# Patient Record
Sex: FEMALE | Race: ASIAN | Employment: OTHER | URBAN - METROPOLITAN AREA
[De-identification: names, ages, dates, MRNs, and addresses within clinical notes are randomized per-mention and may not be internally consistent; named-entity substitution may affect disease eponyms.]

---

## 2023-10-05 ENCOUNTER — OFFICE VISIT (OUTPATIENT)
Dept: CARDIOLOGY | Facility: CLINIC | Age: 78
End: 2023-10-05
Attending: INTERNAL MEDICINE
Payer: COMMERCIAL

## 2023-10-05 VITALS
SYSTOLIC BLOOD PRESSURE: 171 MMHG | WEIGHT: 159.8 LBS | OXYGEN SATURATION: 99 % | DIASTOLIC BLOOD PRESSURE: 79 MMHG | HEART RATE: 65 BPM

## 2023-10-05 DIAGNOSIS — R06.02 SOB (SHORTNESS OF BREATH): ICD-10-CM

## 2023-10-05 DIAGNOSIS — I25.10 CORONARY ARTERY DISEASE INVOLVING NATIVE HEART, UNSPECIFIED VESSEL OR LESION TYPE, UNSPECIFIED WHETHER ANGINA PRESENT: ICD-10-CM

## 2023-10-05 DIAGNOSIS — I35.0 SEVERE AORTIC STENOSIS: Primary | ICD-10-CM

## 2023-10-05 PROCEDURE — 93010 ELECTROCARDIOGRAM REPORT: CPT | Performed by: INTERNAL MEDICINE

## 2023-10-05 PROCEDURE — 99215 OFFICE O/P EST HI 40 MIN: CPT | Performed by: INTERNAL MEDICINE

## 2023-10-05 PROCEDURE — 93005 ELECTROCARDIOGRAM TRACING: CPT | Performed by: INTERNAL MEDICINE

## 2023-10-05 PROCEDURE — G0463 HOSPITAL OUTPT CLINIC VISIT: HCPCS | Performed by: INTERNAL MEDICINE

## 2023-10-05 RX ORDER — ATORVASTATIN CALCIUM 20 MG/1
20 TABLET, FILM COATED ORAL DAILY
COMMUNITY
Start: 2023-09-15

## 2023-10-05 RX ORDER — LISINOPRIL 40 MG/1
40 TABLET ORAL DAILY
COMMUNITY
Start: 2023-09-15

## 2023-10-05 RX ORDER — ASPIRIN 81 MG/1
243 TABLET, CHEWABLE ORAL ONCE
Status: CANCELLED | OUTPATIENT
Start: 2023-10-05

## 2023-10-05 RX ORDER — HYDROCHLOROTHIAZIDE 25 MG/1
25 TABLET ORAL DAILY
Status: ON HOLD | COMMUNITY
Start: 2023-09-15 | End: 2023-10-26

## 2023-10-05 RX ORDER — POTASSIUM CHLORIDE 1500 MG/1
40 TABLET, EXTENDED RELEASE ORAL
Status: CANCELLED | OUTPATIENT
Start: 2023-10-05

## 2023-10-05 RX ORDER — ASPIRIN 325 MG
325 TABLET ORAL ONCE
Status: CANCELLED | OUTPATIENT
Start: 2023-10-05 | End: 2023-10-05

## 2023-10-05 RX ORDER — BLOOD SUGAR DIAGNOSTIC
STRIP MISCELLANEOUS
COMMUNITY
Start: 2023-09-27

## 2023-10-05 RX ORDER — METOPROLOL SUCCINATE 100 MG/1
100 TABLET, EXTENDED RELEASE ORAL DAILY
Status: ON HOLD | COMMUNITY
Start: 2023-09-15 | End: 2023-10-26

## 2023-10-05 RX ORDER — POTASSIUM CHLORIDE 1500 MG/1
20 TABLET, EXTENDED RELEASE ORAL
Status: CANCELLED | OUTPATIENT
Start: 2023-10-05

## 2023-10-05 RX ORDER — ACETAMINOPHEN 500 MG
500-1000 TABLET ORAL EVERY 6 HOURS PRN
COMMUNITY
Start: 2022-07-12

## 2023-10-05 RX ORDER — FLUTICASONE PROPIONATE 44 UG/1
2 AEROSOL, METERED RESPIRATORY (INHALATION) 2 TIMES DAILY
COMMUNITY
Start: 2023-09-15 | End: 2024-09-14

## 2023-10-05 RX ORDER — LIDOCAINE 40 MG/G
CREAM TOPICAL
Status: CANCELLED | OUTPATIENT
Start: 2023-10-05

## 2023-10-05 RX ORDER — SODIUM CHLORIDE 9 MG/ML
INJECTION, SOLUTION INTRAVENOUS CONTINUOUS
Status: CANCELLED | OUTPATIENT
Start: 2023-10-05

## 2023-10-05 RX ORDER — ALBUTEROL SULFATE 90 UG/1
AEROSOL, METERED RESPIRATORY (INHALATION)
COMMUNITY
Start: 2023-09-15

## 2023-10-05 ASSESSMENT — PAIN SCALES - GENERAL: PAINLEVEL: NO PAIN (0)

## 2023-10-05 NOTE — LETTER
10/5/2023      RE: Lacey Casarez  [address]  [facility] [address]       Dear Colleague,    Thank you for the opportunity to participate in the care of your patient, Lacey Casarez, at the [facility] HEART [facility]IN[facility] [place] [facility]. Please see a copy of my visit note below.        [facility] HEART CARE  CARDIOVASCULAR DIVISION    VALVE CLINIC INITIAL CONSULTATION    PRIMARY CARDIOLOGIST: Dr. Connors      PERTINENT CLINICAL HISTORY:     Lacey Casarez is a very pleasant 78 year old female with severe aortic valvular stenosis referred to our clinic for evaluation and consideration for potential transcatheter aortic valve replacement (TAVR).  Her severe aortic stenosis is characterized with an area of 0.58 cm2, mean gradient 61 mmHg and peak velocity of 5.5 m/sec with LVEF 60% by echocardiogram on 9/25/23.  Additional notable medical history of HTN, HLD, pre diabetes.     Patient was recently seen by her primary care physician, who noted a systolic murmur on physical exam. Patient also complained of increased exertional dyspnea prompting on echocardiogram that demonstrated progression of aortic stenosis from mild in 2017 to severe. Patient says that she has been getting progressively short of breath going up the stairs at her [employer]. Denies any chest pain, syncope/presyncope, orthopnea, PND or lower extremity edema. She is very anxious about undergoing invasive procedures.      PAST MEDICAL HISTORY:   HTN  Prediabetes   Hyperlipidemia  Severe aortic stenosis   PAST SURGICAL HISTORY:   No past surgical history on file.     CURRENT MEDICATIONS:     Current Outpatient Medications   Medication Sig Dispense Refill     acetaminophen (TYLENOL) 500 MG tablet Maximum acetaminophen dose is 4000 mg in 24 hours       albuterol (PROAIR HFA/PROVENTIL HFA/VENTOLIN HFA) 108 (90 Base) MCG/ACT inhaler Inhale 1-2 Puffs every 6 hours as needed for Wheezing.        atorvastatin (LIPITOR) 20 MG tablet Take 20 mg by mouth daily       blood glucose (ONETOUCH VERIO IQ) test strip TEST BLOOD SUGAR ONCE WEEKLY.       fluticasone (FLOVENT HFA) 44 MCG/ACT inhaler Inhale 2 puffs into the lungs       hydrochlorothiazide (HYDRODIURIL) 25 MG tablet Take 25 mg by mouth daily       lisinopril (ZESTRIL) 40 MG tablet Take 40 mg by mouth daily       metoprolol succinate ER (TOPROL XL) 100 MG 24 hr tablet Take 100 mg by mouth daily        ALLERGIES:   Not on File     FAMILY HISTORY:   No family history on file.     SOCIAL HISTORY:   Not on file     REVIEW OF SYSTEMS:     Constitutional: No fevers or chills  Skin: No new rash or itching  Eyes: No acute change in vision  Ears/Nose/Throat: No purulent rhinorrhea, new hearing loss, or new vertigo  Respiratory: No cough or hemoptysis  Cardiovascular: See HPI  Gastrointestinal: No change in appetite, vomiting, hematemesis or diarrhea  Genitourinary: No dysuria or hematuria  Musculoskeletal: No new back pain, neck pain or muscle pain  Neurologic: No new headaches, focal weakness or behavior changes  Psychiatric: No hallucinations, excessive alcohol consumption or illegal drug usage  Hematologic/Lymphatic/Immunologic: No bleeding, chills, fever, night sweats or weight loss  Endocrine: No new cold intolerance, heat intolerance, polyphagia, polydipsia or polyuria      PHYSICAL EXAMINATION:     BP (!) 171/79 (BP Location: Right arm, Patient Position: Chair, Cuff Size: Adult Regular)   Pulse 65   Wt 72.5 kg (159 lb 12.8 oz)   SpO2 99%     GENERAL: No acute distress.  HEENT: EOMI. Sclerae white, not injected. Nares clear. Pharynx without erythema or exudate.   Neck: No adenopathy. No thyromegaly. Symmetrical.   Heart: Regular rate and rhythm. Harsh crescendo decrescendo late peaking systolic murmur with radiation to carotids. Delayed carotid pulses.   Lungs: Clear to auscultation. No ronchi, wheezes, rales.   Abdomen: Soft, nontender, nondistended. Bowel  sounds present.  Extremities: No clubbing, cyanosis, or edema.   Neurologic: Alert and oriented to person/place/time, normal speech and affect. No focal deficits.  Skin: No petechiae, purpura or rash.     LABORATORY DATA:     Reviewed from [facility] within normal limits     PROCEDURES & FURTHER ASSESSMENTS:     ECG dated 10/5/2023:    Echocardiogram dated :  Echo 09/25/23:  Severe aortic stenosis. Peak velocity 5.5 m/s, mean gradient 61 mmHg,  PAUL 0.58 cm2.  Mild aortic regurgitation.  Normal left ventricular size and global and regional function. Left  ventricular ejection fraction is visually estimated at 65%.  Normal right ventricle size and normal global function.  Severe left atrial enlargement.  Mild tricuspid regurgitation.  Pulmonary artery systolic pressure estimate is 60mmHg above  RAP(Elevated).  Compared to prior, aortic stenosis has progressed significantly.    STS RISK SCORE: 3.18%  FRAILTY SCORE: 0/5  DENTAL SCREEN: Pass  NYHA CLASS: II    Mallampati score: II    ASA physical status classification: 3     CLINICAL IMPRESSION:   Lacey Casarez is a very pleasant 78 year old female with severe aortic valvular stenosis referred to our clinic for evaluation and consideration for potential transcatheter aortic valve replacement (TAVR).  Patient reports progressive exertional dyspnea with climbing stairs with recent echo demonstrating severe aortic stenosis characterized with an area of 0.58 cm2, mean gradient 61 mmHg and peak velocity of 5.5 m/sec with LVEF 60%.      We discussed the natural history of severe aortic stenosis, associated symptoms and available treatment options including transcatheter versus surgical aortic valve replacement. Patient is a appropriate candidate for transcatheter aortic valve replacement based on age, frailty, co-morbidities and surgical mortality risk estimation of 3.18% based on the STS score.     The pre-procedural TAVR evaluation currently requires additional  assessment with CT TAVR, coronary angiogram, and right heart catheterization prior to presentation to the Heart team for discussion during our multi-disciplinary conference for consensus decision and procedural planning.     Plan Summary:  1) Severe Aortic Stenosis:  -Proceed with TAVR evalution   -CT TAVR, coronary angiogram and right heart catheterization  -Following completion of TAVR work-up patient case will be discussed during our multi-disciplinary conference for consensus decision and procedural planning    Patient was evaluated in clinic with Dr. Heredia.    HONEY Preston, CNP  Structural Heart Nurse Practitioner  [facility]  Clinic: [phone]  Pager: [phone]      CC  No care team member to display  SELF, REFERRED          Please do not hesitate to contact me if you have any questions/concerns.     Sincerely,     Jarocho Heredia MD

## 2023-10-05 NOTE — NURSING NOTE
Chief Complaint   Patient presents with    New Patient     NEW TAVR patient     Vitals were taken and medications reconciled.    Wally Starr, EMT  2:21 PM

## 2023-10-05 NOTE — PATIENT INSTRUCTIONS
You were seen today in the Cardiovascular Clinic at the [facility].      Cardiology Providers you saw during your visit:  Dr. Jarocho Heredia and Dee Lopez NP    Recommendations:    TAVR CT  Coronary angiogram  ++++++++++++++++++++++++++++++++++++++++++++++++++++++++++++++++++++++++++++++++  October 6. Check in at 9:45 a.m.  Pre-procedure instructions - CT Angiogram TAVR/TMVR  Patient Education    Your arrival time is 9:45 .am.  Location is [address] Waiting [facility]  How do I prepare for my exam? (Food and drink instructions)  **You will have contrast for this exam.**  No Food and Drink Restrictions     The day before your exam, drink extra fluids-at least six 8-ounce glasses (unless your doctor tells you to restrict your fluids).     What should I wear: Please wear loose clothing, such as a sweat suit or jogging clothes.  Avoid snaps, zippers and other metal. We may ask you to undress and put on a hospital gown.     How long does the exam take: Plan to spend up to 1 hour at the hospital     What should I bring: Please bring any scans or X-rays taken at other hospitals, if similar tests were done. It is safest to leave personal items at home.     Do I need a :  No  is needed.     What do I need to tell my doctor?  Be sure to tell your doctor:  * If you have any allergies.  * If there's any chance you are pregnant.  * If you are breastfeeding.  * If you have diabetes as your medication may need to be adjusted for this exam.     What should I do after the exam: No restrictions, You may resume normal activities.     What is this test: A CT (computed tomography) scan is a series of pictures that allows us to look inside your body. The scanner creates images of the body in cross sections, much like slices of bread. This helps us see any problems more clearly. You may receive contrast (X-ray dye) before  or during your scan. Contrast is given through an IV (small needle in your arm).     Who should I call with questions: If you have any questions, please call the Imaging Department where you will have your exam. Directions, parking instructions, and other information is available on our website, [email or web address].  +++++++++++++++++++++++++++++++++++++++++++++++++++++++++++++++++++++++    DATE:  TBD  Pre-procedure instructions - Coronary Angiogram  Patient Education    Your arrival time is TBD.  Location is [address] Waiting Room  Please plan on being at the hospital all day.  At any time, emergencies and/or urgent cases may come up which could delay the start of your procedure.    Pre-procedure instructions - Coronary Angiogram  Shower in the evening before or the morning of the procedure  No solid food for 8 hours prior and nothing to drink 2 hours prior to arrival time  You can take your morning medications (except for diabetic and blood thinners) with sips of water.  Take 325 mg of Asprin 24 hours prior to the procedure and the morning of procedure.   You will need to arrange a ride to drop you off and pick you up, as you will be unable to drive home.  Prior to discharge you may be required to lay flat for approximately 2-4 hours in the recovery unit to ensure proper clotting of the artery.    You will need to follow up with one of our cardiology APPs 1-2 weeks after your procedure. If you need help scheduling or rescheduling your appointment, please call [phone]      Nursing questions:  YOEL Duarte;  [phone]  For emergencies call 911.    It was a pleasure to see you in clinic.  If you have any questions at all, please feel free to give me a call.      Felicia Zayas RN  Structural Heart Care Coordinator   TAVR and MitraClip Programs  [facility]  Office: [phone]    Clinics and  Surgery Center  [address] [facility]  Cardiology Clinic [address]  [place] [address]

## 2023-10-05 NOTE — PROGRESS NOTES
[facility] HEART [facility]  CARDIOVASCULAR DIVISION    VALVE CLINIC INITIAL CONSULTATION    PRIMARY CARDIOLOGIST: Dr. Connors      PERTINENT CLINICAL HISTORY:     Lacey Casarez is a very pleasant 78 year old female with severe aortic valvular stenosis referred to our clinic for evaluation and consideration for potential transcatheter aortic valve replacement (TAVR).  Her severe aortic stenosis is characterized with an area of 0.58 cm2, mean gradient 61 mmHg and peak velocity of 5.5 m/sec with LVEF 60% by echocardiogram on 9/25/23.  Additional notable medical history of HTN, HLD, pre diabetes.     Patient was recently seen by her primary care physician, who noted a systolic murmur on physical exam. Patient also complained of increased exertional dyspnea prompting on echocardiogram that demonstrated progression of aortic stenosis from mild in 2017 to severe. Patient says that she has been getting progressively short of breath going up the stairs at her [employer]. Denies any chest pain, syncope/presyncope, orthopnea, PND or lower extremity edema. She is very anxious about undergoing invasive procedures.      PAST MEDICAL HISTORY:   HTN  Prediabetes   Hyperlipidemia  Severe aortic stenosis   PAST SURGICAL HISTORY:   No past surgical history on file.     CURRENT MEDICATIONS:     Current Outpatient Medications   Medication Sig Dispense Refill    acetaminophen (TYLENOL) 500 MG tablet Maximum acetaminophen dose is 4000 mg in 24 hours      albuterol (PROAIR HFA/PROVENTIL HFA/VENTOLIN HFA) 108 (90 Base) MCG/ACT inhaler Inhale 1-2 Puffs every 6 hours as needed for Wheezing.      atorvastatin (LIPITOR) 20 MG tablet Take 20 mg by mouth daily      blood glucose (ONETOUCH VERIO IQ) test strip TEST BLOOD SUGAR ONCE WEEKLY.      fluticasone (FLOVENT HFA) 44 MCG/ACT inhaler Inhale 2 puffs into the lungs      hydrochlorothiazide (HYDRODIURIL) 25 MG tablet Take 25 mg by mouth daily      lisinopril (ZESTRIL) 40 MG tablet Take 40 mg by  mouth daily      metoprolol succinate ER (TOPROL XL) 100 MG 24 hr tablet Take 100 mg by mouth daily        ALLERGIES:   Not on File     FAMILY HISTORY:   No family history on file.     SOCIAL HISTORY:   Not on file     REVIEW OF SYSTEMS:     Constitutional: No fevers or chills  Skin: No new rash or itching  Eyes: No acute change in vision  Ears/Nose/Throat: No purulent rhinorrhea, new hearing loss, or new vertigo  Respiratory: No cough or hemoptysis  Cardiovascular: See HPI  Gastrointestinal: No change in appetite, vomiting, hematemesis or diarrhea  Genitourinary: No dysuria or hematuria  Musculoskeletal: No new back pain, neck pain or muscle pain  Neurologic: No new headaches, focal weakness or behavior changes  Psychiatric: No hallucinations, excessive alcohol consumption or illegal drug usage  Hematologic/Lymphatic/Immunologic: No bleeding, chills, fever, night sweats or weight loss  Endocrine: No new cold intolerance, heat intolerance, polyphagia, polydipsia or polyuria      PHYSICAL EXAMINATION:     BP (!) 171/79 (BP Location: Right arm, Patient Position: Chair, Cuff Size: Adult Regular)   Pulse 65   Wt 72.5 kg (159 lb 12.8 oz)   SpO2 99%     GENERAL: No acute distress.  HEENT: EOMI. Sclerae white, not injected. Nares clear. Pharynx without erythema or exudate.   Neck: No adenopathy. No thyromegaly. Symmetrical.   Heart: Regular rate and rhythm. Harsh crescendo decrescendo late peaking systolic murmur with radiation to carotids. Delayed carotid pulses.   Lungs: Clear to auscultation. No ronchi, wheezes, rales.   Abdomen: Soft, nontender, nondistended. Bowel sounds present.  Extremities: No clubbing, cyanosis, or edema.   Neurologic: Alert and oriented to person/place/time, normal speech and affect. No focal deficits.  Skin: No petechiae, purpura or rash.     LABORATORY DATA:     Reviewed from [facility] within normal limits     PROCEDURES & FURTHER ASSESSMENTS:     ECG dated  10/5/2023:    Echocardiogram dated :  Echo 09/25/23:  Severe aortic stenosis. Peak velocity 5.5 m/s, mean gradient 61 mmHg,  PAUL 0.58 cm2.  Mild aortic regurgitation.  Normal left ventricular size and global and regional function. Left  ventricular ejection fraction is visually estimated at 65%.  Normal right ventricle size and normal global function.  Severe left atrial enlargement.  Mild tricuspid regurgitation.  Pulmonary artery systolic pressure estimate is 60mmHg above  RAP(Elevated).  Compared to prior, aortic stenosis has progressed significantly.    STS RISK SCORE: 3.18%  FRAILTY SCORE: 0/5  DENTAL SCREEN: Pass  NYHA CLASS: II    Mallampati score: II    ASA physical status classification: 3     CLINICAL IMPRESSION:   Lacey Casarez is a very pleasant 78 year old female with severe aortic valvular stenosis referred to our clinic for evaluation and consideration for potential transcatheter aortic valve replacement (TAVR).  Patient reports progressive exertional dyspnea with climbing stairs with recent echo demonstrating severe aortic stenosis characterized with an area of 0.58 cm2, mean gradient 61 mmHg and peak velocity of 5.5 m/sec with LVEF 60%.      We discussed the natural history of severe aortic stenosis, associated symptoms and available treatment options including transcatheter versus surgical aortic valve replacement. Patient is a appropriate candidate for transcatheter aortic valve replacement based on age, frailty, co-morbidities and surgical mortality risk estimation of 3.18% based on the STS score.     The pre-procedural TAVR evaluation currently requires additional assessment with CT TAVR, coronary angiogram, and right heart catheterization prior to presentation to the Heart team for discussion during our multi-disciplinary conference for consensus decision and procedural planning.     Plan Summary:  1) Severe Aortic Stenosis:  -Proceed with TAVR evalution   -CT TAVR, coronary angiogram and  right heart catheterization  -Following completion of TAVR work-up patient case will be discussed during our multi-disciplinary conference for consensus decision and procedural planning    Patient was evaluated in clinic with Dr. Heredia.    HONEY Preston, CNP  Structural Heart Nurse Practitioner  [facility]  Clinic: [phone]  Pager: [phone]      CC  No care team member to display  SELF, REFERRED

## 2023-10-06 ENCOUNTER — HOSPITAL ENCOUNTER (OUTPATIENT)
Dept: CT IMAGING | Facility: CLINIC | Age: 78
Discharge: HOME | End: 2023-10-06
Attending: INTERNAL MEDICINE | Admitting: INTERNAL MEDICINE
Payer: COMMERCIAL

## 2023-10-06 DIAGNOSIS — R06.02 SOB (SHORTNESS OF BREATH): ICD-10-CM

## 2023-10-06 DIAGNOSIS — I35.0 SEVERE AORTIC STENOSIS: ICD-10-CM

## 2023-10-06 LAB
ATRIAL RATE - MUSE: 72 BPM
DIASTOLIC BLOOD PRESSURE - MUSE: NORMAL MMHG
INTERPRETATION ECG - MUSE: NORMAL
P AXIS - MUSE: 37 DEGREES
PR INTERVAL - MUSE: 144 MS
QRS DURATION - MUSE: 86 MS
QT - MUSE: 382 MS
QTC - MUSE: 418 MS
R AXIS - MUSE: 33 DEGREES
SYSTOLIC BLOOD PRESSURE - MUSE: NORMAL MMHG
T AXIS - MUSE: -38 DEGREES
VENTRICULAR RATE- MUSE: 72 BPM

## 2023-10-06 PROCEDURE — 250N000011 HC RX IP 250 OP 636: Performed by: INTERNAL MEDICINE

## 2023-10-06 PROCEDURE — 71275 CT ANGIOGRAPHY CHEST: CPT | Mod: 26 | Performed by: INTERNAL MEDICINE

## 2023-10-06 PROCEDURE — 74174 CTA ABD&PLVS W/CONTRAST: CPT | Mod: 26 | Performed by: INTERNAL MEDICINE

## 2023-10-06 PROCEDURE — 999N000248 HC STATISTIC IV INSERT WITH US BY RN

## 2023-10-06 PROCEDURE — 74174 CTA ABD&PLVS W/CONTRAST: CPT

## 2023-10-06 RX ORDER — IOPAMIDOL 755 MG/ML
110 INJECTION, SOLUTION INTRAVASCULAR ONCE
Status: COMPLETED | OUTPATIENT
Start: 2023-10-06 | End: 2023-10-06

## 2023-10-06 RX ORDER — IOPAMIDOL 755 MG/ML
110 INJECTION, SOLUTION INTRAVASCULAR ONCE
Status: DISCONTINUED | OUTPATIENT
Start: 2023-10-06 | End: 2023-10-06

## 2023-10-06 RX ADMIN — IOPAMIDOL 110 ML: 755 INJECTION, SOLUTION INTRAVENOUS at 09:25

## 2023-10-07 ENCOUNTER — TELEPHONE (OUTPATIENT)
Dept: NURSING | Facility: CLINIC | Age: 78
End: 2023-10-07
Payer: COMMERCIAL

## 2023-10-07 NOTE — TELEPHONE ENCOUNTER
Pt calling in asking about what she needs to know/do prior to her Angiogram procedure on Monday morning. Writer found & advised the following from pt's chart:            Pt declined need for triage at this time.           Michelle Martinez RN on 10/7/2023 at 12:35 PM

## 2023-10-09 ENCOUNTER — APPOINTMENT (OUTPATIENT)
Dept: LAB | Facility: CLINIC | Age: 78
End: 2023-10-09
Attending: INTERNAL MEDICINE
Payer: COMMERCIAL

## 2023-10-09 ENCOUNTER — APPOINTMENT (OUTPATIENT)
Dept: MEDSURG UNIT | Facility: CLINIC | Age: 78
End: 2023-10-09
Attending: INTERNAL MEDICINE
Payer: COMMERCIAL

## 2023-10-09 ENCOUNTER — APPOINTMENT (OUTPATIENT)
Dept: CARDIOLOGY | Facility: CLINIC | Age: 78
End: 2023-10-09
Attending: STUDENT IN AN ORGANIZED HEALTH CARE EDUCATION/TRAINING PROGRAM
Payer: COMMERCIAL

## 2023-10-09 ENCOUNTER — HOSPITAL ENCOUNTER (OUTPATIENT)
Facility: CLINIC | Age: 78
Discharge: HOME | End: 2023-10-09
Attending: INTERNAL MEDICINE | Admitting: STUDENT IN AN ORGANIZED HEALTH CARE EDUCATION/TRAINING PROGRAM
Payer: COMMERCIAL

## 2023-10-09 VITALS
TEMPERATURE: 97.7 F | OXYGEN SATURATION: 98 % | WEIGHT: 158.73 LBS | RESPIRATION RATE: 14 BRPM | SYSTOLIC BLOOD PRESSURE: 120 MMHG | BODY MASS INDEX: 29.21 KG/M2 | HEART RATE: 58 BPM | DIASTOLIC BLOOD PRESSURE: 53 MMHG | HEIGHT: 62 IN

## 2023-10-09 DIAGNOSIS — I35.0 SEVERE AORTIC STENOSIS: Primary | ICD-10-CM

## 2023-10-09 DIAGNOSIS — R06.02 SOB (SHORTNESS OF BREATH): ICD-10-CM

## 2023-10-09 DIAGNOSIS — I25.10 CORONARY ARTERY DISEASE INVOLVING NATIVE HEART, UNSPECIFIED VESSEL OR LESION TYPE, UNSPECIFIED WHETHER ANGINA PRESENT: ICD-10-CM

## 2023-10-09 PROBLEM — Z98.890 STATUS POST CORONARY ANGIOGRAM: Status: ACTIVE | Noted: 2023-10-09

## 2023-10-09 LAB
ACT BLD: 168 SECONDS (ref 74–150)
ACT BLD: 222 SECONDS (ref 74–150)
ANION GAP SERPL CALCULATED.3IONS-SCNC: 13 MMOL/L (ref 7–15)
BUN SERPL-MCNC: 27.5 MG/DL (ref 8–23)
CALCIUM SERPL-MCNC: 9.9 MG/DL (ref 8.8–10.2)
CHLORIDE SERPL-SCNC: 97 MMOL/L (ref 98–107)
CREAT SERPL-MCNC: 0.7 MG/DL (ref 0.51–0.95)
DEPRECATED HCO3 PLAS-SCNC: 27 MMOL/L (ref 22–29)
EGFRCR SERPLBLD CKD-EPI 2021: 87 ML/MIN/1.73M2
ERYTHROCYTE [DISTWIDTH] IN BLOOD BY AUTOMATED COUNT: 12.6 % (ref 10–15)
GLUCOSE SERPL-MCNC: 108 MG/DL (ref 70–99)
HCT VFR BLD AUTO: 41 % (ref 35–47)
HGB BLD-MCNC: 12.8 G/DL (ref 11.7–15.7)
HGB BLD-MCNC: 13.6 G/DL (ref 11.7–15.7)
INR PPP: 1.11 (ref 0.85–1.15)
LVEF ECHO: NORMAL
MCH RBC QN AUTO: 30.7 PG (ref 26.5–33)
MCHC RBC AUTO-ENTMCNC: 33.2 G/DL (ref 31.5–36.5)
MCV RBC AUTO: 93 FL (ref 78–100)
OXYHGB MFR BLDV: 66 % (ref 92–100)
PLATELET # BLD AUTO: 130 10E3/UL (ref 150–450)
POTASSIUM SERPL-SCNC: 5 MMOL/L (ref 3.4–5.3)
RBC # BLD AUTO: 4.43 10E6/UL (ref 3.8–5.2)
SODIUM SERPL-SCNC: 137 MMOL/L (ref 135–145)
WBC # BLD AUTO: 5.5 10E3/UL (ref 4–11)

## 2023-10-09 PROCEDURE — 93306 TTE W/DOPPLER COMPLETE: CPT | Mod: 26 | Performed by: INTERNAL MEDICINE

## 2023-10-09 PROCEDURE — 999N000054 HC STATISTIC EKG NON-CHARGEABLE

## 2023-10-09 PROCEDURE — 999N000134 HC STATISTIC PP CARE STAGE 3

## 2023-10-09 PROCEDURE — 93456 R HRT CORONARY ARTERY ANGIO: CPT | Mod: 26 | Performed by: INTERNAL MEDICINE

## 2023-10-09 PROCEDURE — C1894 INTRO/SHEATH, NON-LASER: HCPCS | Performed by: INTERNAL MEDICINE

## 2023-10-09 PROCEDURE — 93306 TTE W/DOPPLER COMPLETE: CPT

## 2023-10-09 PROCEDURE — 82310 ASSAY OF CALCIUM: CPT | Performed by: INTERNAL MEDICINE

## 2023-10-09 PROCEDURE — 250N000009 HC RX 250: Performed by: INTERNAL MEDICINE

## 2023-10-09 PROCEDURE — 93456 R HRT CORONARY ARTERY ANGIO: CPT | Performed by: INTERNAL MEDICINE

## 2023-10-09 PROCEDURE — C1887 CATHETER, GUIDING: HCPCS | Performed by: INTERNAL MEDICINE

## 2023-10-09 PROCEDURE — 999N000142 HC STATISTIC PROCEDURE PREP ONLY

## 2023-10-09 PROCEDURE — 250N000011 HC RX IP 250 OP 636: Performed by: INTERNAL MEDICINE

## 2023-10-09 PROCEDURE — 250N000011 HC RX IP 250 OP 636: Mod: JZ | Performed by: INTERNAL MEDICINE

## 2023-10-09 PROCEDURE — 85027 COMPLETE CBC AUTOMATED: CPT | Performed by: INTERNAL MEDICINE

## 2023-10-09 PROCEDURE — 36415 COLL VENOUS BLD VENIPUNCTURE: CPT | Performed by: INTERNAL MEDICINE

## 2023-10-09 PROCEDURE — 999N000248 HC STATISTIC IV INSERT WITH US BY RN

## 2023-10-09 PROCEDURE — 85018 HEMOGLOBIN: CPT | Mod: 91

## 2023-10-09 PROCEDURE — 99152 MOD SED SAME PHYS/QHP 5/>YRS: CPT | Performed by: INTERNAL MEDICINE

## 2023-10-09 PROCEDURE — 85347 COAGULATION TIME ACTIVATED: CPT

## 2023-10-09 PROCEDURE — 99153 MOD SED SAME PHYS/QHP EA: CPT | Performed by: INTERNAL MEDICINE

## 2023-10-09 PROCEDURE — 99152 MOD SED SAME PHYS/QHP 5/>YRS: CPT | Mod: GC | Performed by: INTERNAL MEDICINE

## 2023-10-09 PROCEDURE — 85610 PROTHROMBIN TIME: CPT | Performed by: INTERNAL MEDICINE

## 2023-10-09 PROCEDURE — 93005 ELECTROCARDIOGRAM TRACING: CPT

## 2023-10-09 PROCEDURE — 82810 BLOOD GASES O2 SAT ONLY: CPT

## 2023-10-09 PROCEDURE — 272N000001 HC OR GENERAL SUPPLY STERILE: Performed by: INTERNAL MEDICINE

## 2023-10-09 PROCEDURE — C1751 CATH, INF, PER/CENT/MIDLINE: HCPCS | Performed by: INTERNAL MEDICINE

## 2023-10-09 RX ORDER — OXYCODONE HYDROCHLORIDE 10 MG/1
10 TABLET ORAL EVERY 4 HOURS PRN
Status: DISCONTINUED | OUTPATIENT
Start: 2023-10-09 | End: 2023-10-09 | Stop reason: HOSPADM

## 2023-10-09 RX ORDER — FENTANYL CITRATE 50 UG/ML
25 INJECTION, SOLUTION INTRAMUSCULAR; INTRAVENOUS
Status: DISCONTINUED | OUTPATIENT
Start: 2023-10-09 | End: 2023-10-09 | Stop reason: HOSPADM

## 2023-10-09 RX ORDER — LIDOCAINE 40 MG/G
CREAM TOPICAL
Status: DISCONTINUED | OUTPATIENT
Start: 2023-10-09 | End: 2023-10-09 | Stop reason: HOSPADM

## 2023-10-09 RX ORDER — ATROPINE SULFATE 0.1 MG/ML
0.5 INJECTION INTRAVENOUS
Status: DISCONTINUED | OUTPATIENT
Start: 2023-10-09 | End: 2023-10-09 | Stop reason: HOSPADM

## 2023-10-09 RX ORDER — NICARDIPINE HYDROCHLORIDE 2.5 MG/ML
INJECTION INTRAVENOUS
Status: DISCONTINUED | OUTPATIENT
Start: 2023-10-09 | End: 2023-10-09 | Stop reason: HOSPADM

## 2023-10-09 RX ORDER — OXYCODONE HYDROCHLORIDE 5 MG/1
5 TABLET ORAL EVERY 4 HOURS PRN
Status: DISCONTINUED | OUTPATIENT
Start: 2023-10-09 | End: 2023-10-09 | Stop reason: HOSPADM

## 2023-10-09 RX ORDER — NALOXONE HYDROCHLORIDE 0.4 MG/ML
0.2 INJECTION, SOLUTION INTRAMUSCULAR; INTRAVENOUS; SUBCUTANEOUS
Status: DISCONTINUED | OUTPATIENT
Start: 2023-10-09 | End: 2023-10-09 | Stop reason: HOSPADM

## 2023-10-09 RX ORDER — HEPARIN SODIUM 1000 [USP'U]/ML
INJECTION, SOLUTION INTRAVENOUS; SUBCUTANEOUS
Status: DISCONTINUED | OUTPATIENT
Start: 2023-10-09 | End: 2023-10-09 | Stop reason: HOSPADM

## 2023-10-09 RX ORDER — POTASSIUM CHLORIDE 750 MG/1
40 TABLET, EXTENDED RELEASE ORAL
Status: DISCONTINUED | OUTPATIENT
Start: 2023-10-09 | End: 2023-10-09 | Stop reason: HOSPADM

## 2023-10-09 RX ORDER — ASPIRIN 81 MG/1
243 TABLET, CHEWABLE ORAL ONCE
Status: COMPLETED | OUTPATIENT
Start: 2023-10-09 | End: 2023-10-09

## 2023-10-09 RX ORDER — ASPIRIN 325 MG
325 TABLET ORAL DAILY
Status: ON HOLD | COMMUNITY
End: 2023-10-26

## 2023-10-09 RX ORDER — NALOXONE HYDROCHLORIDE 0.4 MG/ML
0.4 INJECTION, SOLUTION INTRAMUSCULAR; INTRAVENOUS; SUBCUTANEOUS
Status: DISCONTINUED | OUTPATIENT
Start: 2023-10-09 | End: 2023-10-09 | Stop reason: HOSPADM

## 2023-10-09 RX ORDER — SODIUM CHLORIDE 9 MG/ML
INJECTION, SOLUTION INTRAVENOUS CONTINUOUS
Status: DISCONTINUED | OUTPATIENT
Start: 2023-10-09 | End: 2023-10-09 | Stop reason: HOSPADM

## 2023-10-09 RX ORDER — ASPIRIN 325 MG
325 TABLET ORAL ONCE
Status: COMPLETED | OUTPATIENT
Start: 2023-10-09 | End: 2023-10-09

## 2023-10-09 RX ORDER — FENTANYL CITRATE 50 UG/ML
INJECTION, SOLUTION INTRAMUSCULAR; INTRAVENOUS
Status: DISCONTINUED | OUTPATIENT
Start: 2023-10-09 | End: 2023-10-09 | Stop reason: HOSPADM

## 2023-10-09 RX ORDER — FLUMAZENIL 0.1 MG/ML
0.2 INJECTION, SOLUTION INTRAVENOUS
Status: DISCONTINUED | OUTPATIENT
Start: 2023-10-09 | End: 2023-10-09 | Stop reason: HOSPADM

## 2023-10-09 RX ORDER — IOPAMIDOL 755 MG/ML
INJECTION, SOLUTION INTRAVASCULAR
Status: DISCONTINUED | OUTPATIENT
Start: 2023-10-09 | End: 2023-10-09 | Stop reason: HOSPADM

## 2023-10-09 RX ORDER — POTASSIUM CHLORIDE 750 MG/1
20 TABLET, EXTENDED RELEASE ORAL
Status: DISCONTINUED | OUTPATIENT
Start: 2023-10-09 | End: 2023-10-09 | Stop reason: HOSPADM

## 2023-10-09 RX ADMIN — LIDOCAINE: 40 CREAM TOPICAL at 09:09

## 2023-10-09 ASSESSMENT — ACTIVITIES OF DAILY LIVING (ADL)
ADLS_ACUITY_SCORE: 35

## 2023-10-09 NOTE — DISCHARGE INSTRUCTIONS
[facility]                        Interventional Cardiology  Discharge Instructions   Post Right Heart Catheterization      AFTER YOU GO HOME:  DO drink plenty of fluids  DO resume your regular diet and medications unless otherwise instructed by your Primary Physician  Do Not scrub the procedure site vigorously  No lotion or powder to the puncture site for 3 days    CALL YOUR PRIMARY PHYSICIAN IF: You may resume all normal activity.  Monitor neck site for bleeding, swelling, or voice changes. If you notice bleeding or swelling immediately apply pressure to the site and call number below to speak with Cardiology Fellow.  If you experience any changes in your breathing you should call your doctor immediately or come to the closest Emergency Department.  Do not drive yourself.    ADDITIONAL INSTRUCTIONS: Medications: You are to resume all home medications including anticoagulation therapy unless otherwise advised by your primary cardiologist or nurse coordinator.    Follow Up: Per your primary cardiology team    If you have any questions or concerns regarding your procedure site please call [phone] at anytime and ask for Cardiology Fellow on call.  They are available 24 hours a day.  You may also contact the Cardiology Clinic after hours number at [phone].                                                       Telephone Numbers [phone] Monday-Friday 8:00 am to 4:30 pm    [phone] After 4:30 pm Monday-Friday, Weekends & Holidays  Ask for Interventional Cardiologist on call. Someone is on call 24 hours/day   [facility] toll free number [phone] Monday-Friday 8:00 am to 4:30 pm   [facility] Emergency Dept [phone]             [facility]   Interventional Radiology  Discharge Instructions Post Peripheral Angiogram     AFTER YOU GO HOME          Do relax and take it easy for 24 hours.       Do drink plenty of fluids.       Do resume your regular diet,  unless otherwise instructed by your Primary Physician.       DO NOT smoke for at least 24 hours, if you were given any sedation.       DO NOT drink alcoholic beverages the day of your procedure.       Do not drive or operate machinery at home or at work for 24 hours.          DO NOT do any strenuous exercise or lifting for at least 2 days following your Procedure.       DO NOT take a bath or shower for at least 12 hours following your procedure.       DO NOT make any important or legal decisions for 24 hours following your procedure.    CALL THE PHYSICIAN IF:      - You start bleeding from the procedure site.  If you do start to bleed from the site, lie down flat and hold pressure on the site. A small lump or bruise is common at the puncture site.Your physician will tell you if you need to return to the hospital.      - You develop numbness, coolness or a change in color of the arm or leg that was punctured.      - You experience increased pain or redness at the puncture site.      - You develop hives or a rash or unexplained itching.      - You develop a temperature of 101 degrees F or greater  Additional Information:           Support the puncture site for coughing, sneezing, or moving your bowels for the first 48 hours  No tub bath, hot tubs, or swimming for 5 days  No lotion or powder to the puncture site for 3 days              [facility] INTERVENTIONAL RADIOLOGY DEPARTMENT         Procedure Physician:  Dr. Xavier                            Date of Procedure: October 9, 2023       Telephone Numbers:   [phone]......Monday-Friday 7:30am to 4:00pm                                        [phone].....After 4:00 pm Monday-Friday, Weekends and  Holidays. Ask for the Interventional Radiologist on call. Someone is on call 24 hrs/day.

## 2023-10-09 NOTE — PROGRESS NOTES
TR band was down to 7 ml of air and upon removal of 3 ml of air now, the band is empty. Site is CDI.

## 2023-10-09 NOTE — PROGRESS NOTES
Tolerated bedrest.  Tolerated food, fluids, ambulation and urination without complications.  Right distal radial artery access site flat, clean and dry.  Right internal jugular site flat, clean and dry.  Denies pain.  Reviewed discharge instructions with Lance and her son.  PIV removed.  Discharged to home with son.

## 2023-10-09 NOTE — Clinical Note
dry, intact, no bleeding and no hematoma. 6fr sheath pulled from right radial artery. TR band applied (see flowsheets for volume in band). CMS intact.

## 2023-10-09 NOTE — PROGRESS NOTES
Arrived from home for a CORS/RHC and echocardiogram.  BP elevated.  Took 325 mg of aspirin this morning at 05:30 prior to arriving.  H&P current.  Consent obtained.  Will be ready for procedure when labs resulted and ECHO completed.

## 2023-10-09 NOTE — PROGRESS NOTES
Arrived back to [address] post RHC/CORS procedures in [place]. VSS. Denies pain. R internal jugular sheath intact; no bleeding/hematoma noted. RRA TR band secure; no bleeding/hematoma noted. Pt stable.

## 2023-10-09 NOTE — PROGRESS NOTES
7 Faroese sheath removed from right internal jugular by YOEL Glover, and manual pressure held for ten minutes until hemostasis achieved. Right neck site is soft and flat to palpation with no bleeding or hematoma.

## 2023-10-10 LAB
ATRIAL RATE - MUSE: 63 BPM
DIASTOLIC BLOOD PRESSURE - MUSE: NORMAL MMHG
INTERPRETATION ECG - MUSE: NORMAL
P AXIS - MUSE: 63 DEGREES
PR INTERVAL - MUSE: 146 MS
QRS DURATION - MUSE: 86 MS
QT - MUSE: 416 MS
QTC - MUSE: 425 MS
R AXIS - MUSE: 27 DEGREES
SYSTOLIC BLOOD PRESSURE - MUSE: NORMAL MMHG
T AXIS - MUSE: -61 DEGREES
VENTRICULAR RATE- MUSE: 63 BPM

## 2023-10-12 ENCOUNTER — CARE COORDINATION (OUTPATIENT)
Dept: CARDIOLOGY | Facility: CLINIC | Age: 78
End: 2023-10-12
Payer: COMMERCIAL

## 2023-10-12 ENCOUNTER — TELEPHONE (OUTPATIENT)
Dept: CARDIOLOGY | Facility: CLINIC | Age: 78
End: 2023-10-12
Payer: COMMERCIAL

## 2023-10-12 DIAGNOSIS — R06.02 SOB (SHORTNESS OF BREATH): ICD-10-CM

## 2023-10-12 DIAGNOSIS — I35.0 SEVERE AORTIC STENOSIS: Primary | ICD-10-CM

## 2023-10-12 RX ORDER — SODIUM CHLORIDE 9 MG/ML
INJECTION, SOLUTION INTRAVENOUS CONTINUOUS
Status: CANCELLED | OUTPATIENT
Start: 2023-10-12

## 2023-10-12 RX ORDER — LIDOCAINE 40 MG/G
CREAM TOPICAL
Status: CANCELLED | OUTPATIENT
Start: 2023-10-12

## 2023-10-12 RX ORDER — CEFAZOLIN SODIUM 2 G/50ML
2 SOLUTION INTRAVENOUS
Status: CANCELLED | OUTPATIENT
Start: 2023-10-12

## 2023-10-12 RX ORDER — ASPIRIN 325 MG
325 TABLET ORAL DAILY
Status: CANCELLED | OUTPATIENT
Start: 2023-10-12

## 2023-10-12 NOTE — TELEPHONE ENCOUNTER
[facility] Call Center    Phone Message    May a detailed message be left on voicemail: yes     Reason for Call: Other: Susannah, with Utilization Review department called again as they were transferred to a line with no answer to speak with someone on the care team. Susannah stated with patient's procedure scheduled on 10/25, states that it's for inpatient only, and needing the status to be changed to surgery admit. Please call back at [phone] to further discuss.     Action Taken: Other: Cardiology    Travel Screening: Not Applicable    Thank you!  [facility]                                                                       Itraconazole Counseling:  I discussed with the patient the risks of itraconazole including but not limited to liver damage, nausea/vomiting, neuropathy, and severe allergy.  The patient understands that this medication is best absorbed when taken with acidic beverages such as non-diet cola or ginger ale.  The patient understands that monitoring is required including baseline LFTs and repeat LFTs at intervals.  The patient understands that they are to contact us or the primary physician if concerning signs are noted.

## 2023-10-12 NOTE — PROGRESS NOTES
Patient's case was discussed at Valve conference, attended by structural heart cardiologists, CV surgeons, CNP's, and structural heart care coordinators, on October 12, 2023    Patient is appropriate to proceed with TAVR   Guevara Valve  Size 23  Approach: TF      Contacted patient to review discussion at Valve Conference.  Through shared decision making, patient agrees with the plan as outlined above.       Felicia Zayas RN  Lead Structural Heart Care Coordinator    TAVR, MitraClip and Watchman Programs  [facility]  Office: [phone]  -------------------------------------------------------------------------------------  Date: 10/12/2023    Time of Call: 11:29 AM     Diagnosis:  Severe aortic stenosis     [ TORB ] Ordering provider: Dee Lopez NP  Order:   Case request: TAVR  Echo  CBC, BMP, INR, nt pro-BNP, ABO/TS  Pre-TAVR procedure orders    Order received by: Lili Zayas RN  -----------------------------------------------------------------------    TAVR Procedure:  Wednesday, October 25, 2nd case    Check in to the hospital, admitting area on the main floor at 8:00 a.m.    [facility] [address]  [address] [place]  [address]     [facility] parking is available in front of the hospital, 24 hours a day  -  Stop at the Information Desk and the admissions desk in the lobby.      * Please check in at 8:00 a.m.  You will be directed to the Family Surgery Waiting Room, on the 3rd Floor. If you need assistance in walking, please inform people at the time of check in.  * After leaving the registration check-in area, there will be TWO visitors allowed to the pre-op area.  * When patients leave the pre-op area for their procedure, the visitors have the option to stay and wait in the family waiting area or leave the hospital.   * After the procedure is finished, the MD will call the visitor, or see them face-to-face, and update them on the  procedure.     * When the patient arrives to the unit where they will stay until discharge, up to four visitors are allowed to come visit at that time.    -------------------------------------------------------------------------  Nothing to eat after midnight; water, apple juice or 7up/Sprite is OK up to two hours prior to your scheduled procedure.  You may take your medications in the morning with a sip of water.      *Please use the special cleansing wipes, received at your pre-op History and Physical, the night before and the morning of your procedure.  Please focus the use of these wipes from neck to groin, avoiding the face and genital area.      If you did NOT receive these special cleansing wipes at your pre-op History and Physical, then:   * Please use a soap such as hibicleanse or chlorhexadine.  This can be purchased, over the counter, at a pharmacy.  If this is not available, please use an antibacterial soap.  * Take a shower, with antibacterial soap, the night before the procedure, and the morning of the procedure.  Focus the use of this soap from neck to groin, avoiding the face and genital area.    Medications Instructions:  -- PLEASE TAKE: Aspirin 325 mg, by mouth the night before the procedure and the morning of the procedure.    -- OK to take morning of procedure, or, night before the procedure, it that is how it's prescribed:  acetaminophen (if needed)  albuterol    atorvastatin    fluticasone      -- HOLD these medications the morning of the procedure:  hydrochlorothiazide    lisinopril    metoprolol succinate        If any questions please contact:  Felicia Zayas RN  Structural Heart Care Coordinator  [facility]  Office: [phone]  Pager: [phone]

## 2023-10-16 ENCOUNTER — TELEPHONE (OUTPATIENT)
Dept: CARDIOLOGY | Facility: CLINIC | Age: 78
End: 2023-10-16
Payer: COMMERCIAL

## 2023-10-16 NOTE — TELEPHONE ENCOUNTER
VM left to let her know that she should continue her aspirin at this time and most often post TAVR needed lifelong. Appt for Monday with Dee Lopez.

## 2023-10-16 NOTE — TELEPHONE ENCOUNTER
[facility] Call Center    Phone Message    May a detailed message be left on voicemail: yes     Reason for Call: Other: Patient would like to know how long she is to take asprin post angiogram. Patient would like to know if she needs to reschedule her future TAVR appointments. Please confirm appointments on 10/25/2023     Action Taken: Other: cardio    Travel Screening: Not Applicable

## 2023-10-16 NOTE — TELEPHONE ENCOUNTER
[facility] Call Center    Phone Message    May a detailed message be left on voicemail: yes     Reason for Call: Other: [facility] still isn't showing the procedure as surgery admit. Is it on [facility] end or their end. Please call Dayana at      Action Taken: Message routed to:  Other: Cardiology    Travel Screening: Not Applicable      Thank you!  [facility]

## 2023-10-22 ENCOUNTER — HEALTH MAINTENANCE LETTER (OUTPATIENT)
Age: 78
End: 2023-10-22

## 2023-10-22 LAB
ABO/RH(D): NORMAL
ANTIBODY SCREEN: NEGATIVE
SPECIMEN EXPIRATION DATE: NORMAL

## 2023-10-23 ENCOUNTER — OFFICE VISIT (OUTPATIENT)
Dept: CARDIOLOGY | Facility: CLINIC | Age: 78
DRG: 266 | End: 2023-10-23
Attending: NURSE PRACTITIONER
Payer: COMMERCIAL

## 2023-10-23 ENCOUNTER — OFFICE VISIT (OUTPATIENT)
Dept: CARDIOLOGY | Facility: CLINIC | Age: 78
DRG: 266 | End: 2023-10-23
Attending: SURGERY
Payer: COMMERCIAL

## 2023-10-23 ENCOUNTER — LAB (OUTPATIENT)
Dept: LAB | Facility: CLINIC | Age: 78
End: 2023-10-23
Payer: COMMERCIAL

## 2023-10-23 VITALS
HEART RATE: 66 BPM | DIASTOLIC BLOOD PRESSURE: 87 MMHG | OXYGEN SATURATION: 99 % | WEIGHT: 158.73 LBS | BODY MASS INDEX: 29.03 KG/M2 | SYSTOLIC BLOOD PRESSURE: 150 MMHG

## 2023-10-23 VITALS
OXYGEN SATURATION: 99 % | SYSTOLIC BLOOD PRESSURE: 150 MMHG | DIASTOLIC BLOOD PRESSURE: 87 MMHG | BODY MASS INDEX: 29.04 KG/M2 | HEART RATE: 66 BPM | WEIGHT: 158.8 LBS

## 2023-10-23 DIAGNOSIS — Z01.818 PRE-OP EXAM: Primary | ICD-10-CM

## 2023-10-23 DIAGNOSIS — I35.0 SEVERE AORTIC STENOSIS: Primary | ICD-10-CM

## 2023-10-23 DIAGNOSIS — I35.0 SEVERE AORTIC STENOSIS: ICD-10-CM

## 2023-10-23 DIAGNOSIS — R06.02 SOB (SHORTNESS OF BREATH): ICD-10-CM

## 2023-10-23 LAB
ANION GAP SERPL CALCULATED.3IONS-SCNC: 11 MMOL/L (ref 7–15)
BUN SERPL-MCNC: 19.3 MG/DL (ref 8–23)
CALCIUM SERPL-MCNC: 9.6 MG/DL (ref 8.8–10.2)
CHLORIDE SERPL-SCNC: 100 MMOL/L (ref 98–107)
CREAT SERPL-MCNC: 0.6 MG/DL (ref 0.51–0.95)
DEPRECATED HCO3 PLAS-SCNC: 28 MMOL/L (ref 22–29)
EGFRCR SERPLBLD CKD-EPI 2021: >90 ML/MIN/1.73M2
ERYTHROCYTE [DISTWIDTH] IN BLOOD BY AUTOMATED COUNT: 12.6 % (ref 10–15)
GLUCOSE SERPL-MCNC: 106 MG/DL (ref 70–99)
HCT VFR BLD AUTO: 38.5 % (ref 35–47)
HGB BLD-MCNC: 12.8 G/DL (ref 11.7–15.7)
INR PPP: 1.15 (ref 0.85–1.15)
MCH RBC QN AUTO: 30.6 PG (ref 26.5–33)
MCHC RBC AUTO-ENTMCNC: 33.2 G/DL (ref 31.5–36.5)
MCV RBC AUTO: 92 FL (ref 78–100)
NT-PROBNP SERPL-MCNC: 768 PG/ML (ref 0–1800)
PLATELET # BLD AUTO: 144 10E3/UL (ref 150–450)
POTASSIUM SERPL-SCNC: 3.8 MMOL/L (ref 3.4–5.3)
RBC # BLD AUTO: 4.18 10E6/UL (ref 3.8–5.2)
SODIUM SERPL-SCNC: 139 MMOL/L (ref 135–145)
WBC # BLD AUTO: 5.4 10E3/UL (ref 4–11)

## 2023-10-23 PROCEDURE — 93005 ELECTROCARDIOGRAM TRACING: CPT

## 2023-10-23 PROCEDURE — 85027 COMPLETE CBC AUTOMATED: CPT | Performed by: PATHOLOGY

## 2023-10-23 PROCEDURE — 83880 ASSAY OF NATRIURETIC PEPTIDE: CPT | Performed by: PATHOLOGY

## 2023-10-23 PROCEDURE — 80048 BASIC METABOLIC PNL TOTAL CA: CPT | Performed by: PATHOLOGY

## 2023-10-23 PROCEDURE — 36415 COLL VENOUS BLD VENIPUNCTURE: CPT | Performed by: PATHOLOGY

## 2023-10-23 PROCEDURE — 85610 PROTHROMBIN TIME: CPT | Performed by: PATHOLOGY

## 2023-10-23 PROCEDURE — 93010 ELECTROCARDIOGRAM REPORT: CPT | Performed by: INTERNAL MEDICINE

## 2023-10-23 PROCEDURE — 86901 BLOOD TYPING SEROLOGIC RH(D): CPT | Performed by: NURSE PRACTITIONER

## 2023-10-23 PROCEDURE — 86850 RBC ANTIBODY SCREEN: CPT | Performed by: NURSE PRACTITIONER

## 2023-10-23 PROCEDURE — 99214 OFFICE O/P EST MOD 30 MIN: CPT | Performed by: NURSE PRACTITIONER

## 2023-10-23 PROCEDURE — G0463 HOSPITAL OUTPT CLINIC VISIT: HCPCS | Performed by: NURSE PRACTITIONER

## 2023-10-23 PROCEDURE — 99207 PR NON-BILLABLE SERV PER CHARTING: CPT | Performed by: SURGERY

## 2023-10-23 ASSESSMENT — PAIN SCALES - GENERAL
PAINLEVEL: NO PAIN (0)
PAINLEVEL: NO PAIN (0)

## 2023-10-23 NOTE — H&P
"[facility]      CSI History and Physicial  Lacey Casarez MRN: [id number]  1945  Date of Admission: (Not on file)  Primary care provider: Tiff Sparrow         Chief Complaint:   S/p TAVR      Assessment and Plan:     Lacey Casarez is a 78 year old female who was admitted for planned TAVR.      # Severe aortic stenosis  # s/p TAVR  # Acute on chronic heart failure with preserved ejection fraction  Prior to procedure, pt noted to have a mean gradient 50 mmHG, PAUL 0.88 cm^2, PV 4.7 m/s. Now s/p TAVR with 23 mm Edward Terell valve. MG 4 mmHg, trace AI. . Pre procedure LVEDP 29 mmHg. Procedure was uncomplicated.     - Bedrest per protocol.    - Neuro checks, per protocol.  - Monitor groin sites.   - EKG in morning.   - Echocardiogram tomorrow.   - Aspirin 81 mg for anti-platelet therapy.   - Cardiac rehab/PT/OT.  - Daily weights.   - Strict intake/output.       Chronic Issues:  HTN: BP post procedure 138/62, holding PTA Metoprolol with increased risk CHB post TAVR, continue PTA Lisinopril 40 mg dialy and hydrochlorothiazide 25 mg daily  SVT: holding BB as above  Pre-diabetes: most recent A1C 5.6%  Elevated BMI: BMI 29, encouraged healthy lifestyle  modifications    DVT ppx: SCD while on bedrest then ambulation  Diet: 2 g sodium   CODE: Full  Disposition: Anticipate discharge home in 1-2 days pending PT/OT assessment    Medical Decision Making       75 MINUTES SPENT BY ME on the date of service doing chart review, history, exam, documentation & further activities per the note.      Akosua Abreu APRN, CNP  [facility] Cardiology Team    Clinically Significant Risk Factors Present on Admission                # Drug Induced Platelet Defect: home medication list includes an antiplatelet medication   # Overweight: Estimated body mass index is 27.81 kg/m  as calculated from the following:    Height as of this encounter: 1.6 m (5' 3\").    Weight as of this encounter: 71.2 kg (156 lb 15.5 " oz).    Cardiovascular: Cardiac Arrhythmia: Supraventricular tachycardia  Aortic valve stenosis         History of Present Illness:   Lacey Casarez is a 78 year old female with a past medical history of essential hypertension, SVT, prediabetes, elevated BMI and severe aortic stenosis who is admitted for planned TAVR procedure with Dr. Heredia    Patient's primary care provider noted systolic murmur and referred her to echocardiogram which revealed severe aortic stenosis. She also endorses exertional dyspnea. She was referred to cardiology at [facility] (Dr. Connors) who recommended TAVR vs SAVR work up. Patient is reluctant for invasive procedures so has opted to pursue TAVR procedure.     Underwent planned TAVR with 23 mm Edward Terell valve, immediate MG 4 mmHg, trace AI. Procedure was uncomplicated. Patient seen in PACU and denies any concerns. No headache, visual changes, numbness, weakness, or speech difficulties. No dyspnea, chest pain, or palpitations. No pain around access sites. No bleeding.          Review of Systems:    10 point review of systems negative except for stated above in HPI.          Past Medical History:   Medical History reviewed.   History reviewed. No pertinent past medical history.          Past Surgical History:   Surgical History reviewed.   Past Surgical History:   Procedure Laterality Date    CV CORONARY ANGIOGRAM N/A 10/9/2023    Procedure: Coronary Angiogram;  Surgeon: Nick Lagos MD;  Location:  HEART CARDIAC CATH LAB    CV RIGHT HEART CATH MEASUREMENTS RECORDED N/A 10/9/2023    Procedure: Right Heart Catheterization;  Surgeon: Nick Lagos MD;  Location:  HEART CARDIAC CATH LAB             Social History:   Social History reviewed.  Social History     Tobacco Use    Smoking status: Never    Smokeless tobacco: Never   Substance Use Topics    Alcohol use: Not on file             Family History:   Family History reviewed.   History  "reviewed. No pertinent family history.          Allergies:   No Known Allergies          Medications:   Medications Reviewed.   Current Facility-Administered Medications   Medication    aspirin (ASA) tablet 325 mg    lactated ringers infusion    lidocaine (LMX4) cream    lidocaine (LMX4) cream    lidocaine 1 % 0.1-1 mL    lidocaine 1 % 0.1-1 mL    lidocaine 1 %    Patient is NOT allergic to contrast dye    sodium chloride (PF) 0.9% PF flush 3 mL    sodium chloride (PF) 0.9% PF flush 3 mL    sodium chloride (PF) 0.9% PF flush 3 mL    sodium chloride (PF) 0.9% PF flush 3 mL    sodium chloride (PF) 0.9% PF flush 3 mL    sodium chloride 0.9 % infusion     Facility-Administered Medications Ordered in Other Encounters   Medication    dexmedeTOMIDine (PRECEDEX) 4 mcg/mL in sodium chloride 0.9 % 100 mL infusion    fentaNYL (PF) (SUBLIMAZE) injection    heparin (porcine) injection    ketamine (KETALAR) injection    nitroGLYcerin 25 mg in D5W 250 mL infusion CENTRAL    phenylephrine (SULTANA-SYNEPHRINE) injection    phenylephrine 0.2 mg/mL (mcg/kg/min) drip    propofol (DIPRIVAN) infusion    protamine injection             Physical Exam:   Vitals were reviewed.  Blood pressure (!) 158/60, pulse 71, temperature 97.8  F (36.6  C), temperature source Oral, resp. rate 16, height 1.6 m (5' 3\"), weight 71.2 kg (156 lb 15.5 oz), SpO2 99%.    General: Pleasant and well appearing elderly fe/male. Appears comfortable and in no acute distress. Alert and interactive.   Skin: Not jaundiced, no rash, no ecchymoses, warm and dry to touch.  HEENT: NCAT. EOMI. Sclera clear.   CV: RRR, normal S1S2, 2/6 early peaking systolic murmur at right upper sternal border. Bilateral radial, femoral, and pedal pulses palpable. Bilateral femoral access sites are c/d/i. Flat and soft to palpation. No hematoma.    Resp: Normal work of breathing on room air. Clear to auscultation bilaterally, no wheezes, rhonchi  Extremities: warm and well perfused, no edema or " cyanosis   Neuro: Alert and oriented x 3, CN grossly intact. Moves all extremities. Speech normal. No lateralizing symptoms or focal neurologic deficits  Psych: Mood and affect appropriate.           Labs:     CMP  Recent Labs   Lab 10/23/23  1546      POTASSIUM 3.8   CHLORIDE 100   CO2 28   ANIONGAP 11   *   BUN 19.3   CR 0.60   GFRESTIMATED >90   BUSHRA 9.6     CBC  Recent Labs   Lab 10/23/23  1546   WBC 5.4   RBC 4.18   HGB 12.8   HCT 38.5   MCV 92   MCH 30.6   MCHC 33.2   RDW 12.6   *     INR  Recent Labs   Lab 10/23/23  1546   INR 1.15           Diagnostics:      EKG 10/23/23:      ECHO 10/9/23:  Interpretation Summary  Severe aortic stenosis is present.  Global and regional left ventricular function is normal with an EF of 55-60%.  Grade II or moderate diastolic dysfunction.  Global right ventricular function is normal.  IVC diameter <2.1 cm collapsing >50% with sniff suggests a normal RA pressure  of 3 mmHg.  No pericardial effusion is present.  There is no prior study for direct comparison.    Coronary angiogram 10/5/23:  Coronary Findings    Diagnostic  Dominance: Right  Left Main   The vessel is large.      Left Anterior Descending   The vessel is moderate in size.   Prox LAD lesion is 20% stenosed.   Mid LAD lesion is 30% stenosed.      First Diagonal Branch   The vessel is moderate in size and is angiographically normal.      Lateral First Diagonal Branch   The vessel is small and is angiographically normal.      Second Diagonal Branch   The vessel is small and is angiographically normal.      Third Diagonal Branch   The vessel is small and is angiographically normal.      Left Circumflex   The vessel is moderate in size.   Mid Cx to Dist Cx lesion is 30% stenosed.      First Obtuse Marginal Branch   The vessel is small and is angiographically normal.      Second Obtuse Marginal Branch   The vessel is small and is angiographically normal.      Third Obtuse Marginal Branch   The vessel is  moderate in size and is angiographically normal.      Lateral Third Obtuse Marginal Branch   The vessel is moderate in size and is angiographically normal.      Right Coronary Artery   The vessel is moderate in size.   Mid RCA lesion is 30% stenosed.      Acute Marginal Branch   The vessel is small and is angiographically normal.      Right Ventricular Branch   The vessel is small and is angiographically normal.      Right Posterior Descending Artery   The vessel is moderate in size and is angiographically normal.      Right Posterior Atrioventricular Artery   The vessel is small and is angiographically normal.      First Right Posterolateral Branch   The vessel is small and is angiographically normal.      Second Right Posterolateral Branch   The vessel is small and is angiographically normal.      Third Right Posterolateral Branch   The vessel is small and is angiographically normal.         Intervention     No interventions have been documented.       Physician Attestation   I have reviewed and discussed with the advanced practice provider their history, physical and plan for Lacey Casarez. I did not participate in a shared visit by interviewing or examining the patient and this should be billed as an advanced practice provider only visit.    Jarocho Heredia MD  Interventional Cardiology

## 2023-10-23 NOTE — NURSING NOTE
Chief Complaint   Patient presents with    Follow Up     H&P for TAVR on 10/25     Vitals were taken and medications reconciled.    Wally Starr, EMT  2:37 PM

## 2023-10-23 NOTE — LETTER
10/23/2023      RE: Lacey Casarez  [address]  [facility] [address]       Dear Colleague,    Thank you for the opportunity to participate in the care of your patient, Lacey Casarez, at the [facility] [place] at [facility]. Please see a copy of my visit note below.    History of Present Illness  Lacey Casarez is a 78 year old female who presents for evaluation of options for severe aortic stenosis     Patient with a history of severe aortic stenosis, characterized with an area of 0.58 cm2, mean gradient 61 mmHg and peak velocity of 5.5 m/sec with LVEF 60% by echocardiogram on 9/25/23. She reports symptoms of exertional dyspnea. Patient was evaluated in structural heart clinic where she was deemed an appropriate TAVR candidate.       Additional medical history is otherwise notable for HTN, HLD, pre diabetes.        Current Medications:         Current Outpatient Medications   Medication Sig Dispense Refill    acetaminophen (TYLENOL) 500 MG tablet Maximum acetaminophen dose is 4000 mg in 24 hours        albuterol (PROAIR HFA/PROVENTIL HFA/VENTOLIN HFA) 108 (90 Base) MCG/ACT inhaler Inhale 1-2 Puffs every 6 hours as needed for Wheezing.        aspirin (ASA) 325 MG EC tablet Take 325 mg by mouth daily        atorvastatin (LIPITOR) 20 MG tablet Take 20 mg by mouth daily        blood glucose (ONETOUCH VERIO IQ) test strip TEST BLOOD SUGAR ONCE WEEKLY.        fluticasone (FLOVENT HFA) 44 MCG/ACT inhaler Inhale 2 puffs into the lungs        hydrochlorothiazide (HYDRODIURIL) 25 MG tablet Take 25 mg by mouth daily        lisinopril (ZESTRIL) 40 MG tablet Take 40 mg by mouth daily        metoprolol succinate ER (TOPROL XL) 100 MG 24 hr tablet Take 100 mg by mouth daily                Allergies:   No Known Allergies     Past Medical History:  Past Medical History   No past medical history on file.        Past Surgical History:        Past Surgical  "History:   Procedure Laterality Date    CV CORONARY ANGIOGRAM N/A 10/9/2023     Procedure: Coronary Angiogram;  Surgeon: Nick Lagos MD;  Location:  HEART CARDIAC CATH LAB    CV RIGHT HEART CATH MEASUREMENTS RECORDED N/A 10/9/2023     Procedure: Right Heart Catheterization;  Surgeon: Nick Lagos MD;  Location:  HEART CARDIAC CATH LAB            Family History:  Family History   No family history on file.        Social History\"           Socioeconomic History    Marital status:        Spouse name: None    Number of children: None    Years of education: None    Highest education level: None   Tobacco Use    Smoking status: Never    Smokeless tobacco: Never            Review of Systems:     Constitutional: No fever, chills, or sweats. No weight gain/loss   ENT: No visual disturbance, ear ache, epistaxis, sore throat  Allergies/Immunologic: Negative.   Respiratory: No cough, hemoptysia  Cardiovascular: As per HPI  GI: No nausea, vomiting, hematemesis, melena, or hematochezia  : No urinary frequency, dysuria, or hematuria  Integument: Negative  Psychiatric: Negative  Neuro: Negative  Endocrinology: Negative   Musculoskeletal: Negative        Physical Exam:  Vitals: BP (!) 150/87 (BP Location: Left arm, Patient Position: Chair, Cuff Size: Adult Large)   Pulse 66   Wt 72 kg (158 lb 12.8 oz)   SpO2 99%   BMI 29.04 kg/m     General: NAD  HEENT:  Dentition intact.    Neck: No jugular venous distension.   Heart: RRR with 3/6 MARYELLEN at RUSB  Lungs: CTA.    Abdomen: Soft, nontender, nondistended.   Extremities: No clubbing, cyanosis, or edema.  The pulses are +4/4 at the radial, brachial, femoral, popliteal, DP, and PT sites bilaterally.  No bruits are noted.  Neurologic: Alert and oriented to person/place/time, normal speech, gait and affect  Skin: No petechiae, purpura or rash.        Diagnostic Studies:     ECG 10/23/23:  Personally reviewed and interpreted by me.  NSR with " HR 70s, PVCs noted, normal QRSd     ECHO 10/9/23:  Interpretation Summary  Severe aortic stenosis is present.  Global and regional left ventricular function is normal with an EF of 55-60%.  Grade II or moderate diastolic dysfunction.  Global right ventricular function is normal.  IVC diameter <2.1 cm collapsing >50% with sniff suggests a normal RA pressure  of 3 mmHg.  No pericardial effusion is present.  There is no prior study for direct comparison.  ______________________________________________________________________________  Left Ventricle  Global and regional left ventricular function is normal with an EF of 55-60%.  Left ventricular size is normal. Relative wall thickness is increased  consistent with concentric remodeling. Grade II or moderate diastolic  dysfunction.     Right Ventricle  The right ventricle is normal size. Global right ventricular function is  normal.     Atria  The right atria appears normal. Mild left atrial enlargement is present.     Mitral Valve  Mild mitral annular calcification is present. Trace mitral insufficiency is  present.     Aortic Valve  Severe aortic valve calcification is present. Mild aortic insufficiency is  present. Severe aortic stenosis is present. The calculated aortic valve are is  0.88 cm^2. The mean AoV pressure gradient is 50.0 mmHg. The peak aortic  velocity is 4.7 m/sec.     Tricuspid Valve  The tricuspid valve is normal. Trace tricuspid insufficiency is present. The  peak velocity of the tricuspid regurgitant jet is not obtainable.     Pulmonic Valve  The pulmonic valve is normal. Trace pulmonic insufficiency is present.     Vessels  The aorta root is normal. IVC diameter <2.1 cm collapsing >50% with sniff  suggests a normal RA pressure of 3 mmHg.     Pericardium  No pericardial effusion is present.     Compared to Previous Study  There is no prior study for direct comparison.        CT TAVR 10/6/23:  Correct LVOT dimensions are as follows:  1.  The average  LVOT diameter (measured 4 mm below annular plane) is  22.3 mm  2.  LVOT area is 3.92 cm2  3.  LVOT perimeter is 72.9 mm     JAYA CARDOSO MD         SYSTEM ID:  [id number]   Addended by Jaya Cardoso MD on 10/12/2023  7:48 AM      Study Result     Narrative & Impression   Procedure: CT ANGIOGRAM TAVR   Examination Date: 10/6/2023 10:18 AM   Indication: Severe aortic stenosis, Pre TAVR  Ordering Provider: CASI MAST     TECHNIQUE: ECG gated CT of the heart, aorta and nongated CT of the  chest abdomen pelvis without and with IV contrast 110 mL was performed  per the SIERRA protocol on a Siemens Dual Source Flash scanner without  incident. A noncontrast CT of the chest abdomen and pelvis was  performed followed by contrast-enhanced CTA of the heart in a spiral  gated mode with limited field-of-view followed by gated high pitch  spiral CTA of the chest, abdomen, pelvis at 120 kVP to evaluate the  thoracoabdominal aorta and iliac vasculature. Scan protocol was  optimized to minimize radiation exposure. The total radiation exposure  was 1254 DLP and 17.6 mSv. Images were reconstructed and analyzed on a  [employer] Workstation.                                                                       IMPRESSION:     1.  Severely calcified tricuspid aortic valve.   2.  See below for TAVR related aortic annular and vascular  measurements.   3.  No acute aortic dissection, intramural hematoma or contained  rupture noted.  4.  Please review detailed radiology report, to follow separately, for  incidental non-cardiovascular findings.     FINDINGS:     1.  Severely calcified tricuspidaortic valve. Aortic valve calcium  score is 2018. The LVOT is not calcified. The ascending aorta is not  calcified, aortic arch is  mildly calcified, the descending thoracic  aorta is mildly calcified. There is no mitral annular calcification.  2.  There are no adverse aortic root features, i.e. coronary heights  are adequate and  there is no significant aortic root calcification.  3.  There are significant native coronary calcifications.   4.  The arch vessel branching pattern is normal variantt. There is a  single common ostium of the innominate and left common carotid  arteries.   5.  The suprarenal abdominal aorta is moderately calcified; infrarenal  abdominal aorta is severely calcified  6.  Widely patent origins of celiac trunk, superior mesenteric artery  and inferior mesenteric artery.  Single bilateral renal arteries with  widely patent origins.   7.  There is no acute aortic pathology, such as dissection, intramural  hematoma, or contained rupture.      MEASUREMENTS:   Representative dimensions of the thoracoabdominal aorta are as  follows:     1. AORTIC ANNULUS MEASUREMENTS:     1.  The average aortic annulus diameter is 22.3 mm, (long diameter is  26.3 mm and short diameter is 19.7 mm)  2.  Aortic annulus area is 3.92 cm2  3.  Aortic annulus perimeter is 72.1 mm  4.  Suggested 3-cusp coaxial angle for aortic valve is (LAO12 , CAU 7)  and alternate A-P coaxial angle is (LAO0 , CAU19 ), these  angles will  vary depending upon the patient's body position  5.  Aortic root angle is 44.8 degrees  6.  Left main - Annulus distance: 11.0 mm, RCA - Annulus distance:  14.4 mm     2. LVOT MEASUREMENTS:     1.  The average LVOT diameter (measured 4 mm below annular plane) is  21.8 mm  2.  LVOT area is 3.72 cm2  3.  LVOT perimeter is 70.7 mm     3. AORTA MEASUREMENTS     1.  The aortic root at the sinuses of Valsalva (left cusp, right cusp,  non cusp): 27.7 mm x  26  2.  3 mm x 28.1 mm  3.  The sinotubular junction:  23.7 mm x 22.7 mm  4.  Sinotubular junction height: 20.1 mm x 17.9 mm  5.  Proximal ascending aorta: 29.9 mm x 28.3 mm  6.  Distal abdominal aorta proximal to the bifurcation: 13.4 mm x 11.5  mm  7.  Innominate artery 3 cm from ostium: 11.4 mm x 10.8 mm  8.  Left common carotid artery 3 cm from ostium: 6.8 mm x 6.1 mm     4.  "ILIOFEMORAL MEASUREMENTS:     RIGHT:  1.  Right common iliac artery: 9.3 mm x 7.8 mm   2.  Right external iliac artery: 7.4 mm x 7.0 mm   3.  Right common femoral artery: 7.9 mm x 6.6 mm   4.  Tortuosity: mild   5.  Calcification: mild      LEFT:  1.  Left common iliac artery: 10.0 mm x  9.7 mm  2.  Left external iliac artery: 7.1 mm x 6.5 mm  3.  Left common femoral artery: 7.2 mm x 7.1 mm  4.  Tortuosity: mild   5.  Calcification: mild      5. SUBCLAVIAN MEASUREMENTS:     RIGHT:  1. Minimal luminal diameter: 6.6 mm x  5.2 mm  2. Tortuosity: moderate   3. Calcification: mild      LEFT:  1. Minimal luminal diameter: 6.7 mm x  5.4 mm  2. Tortuosity: mild   3. Calcification: mild      OTHER FINDINGS:   1.  Please review radiology review for incidental non-cardiovascular  findings including lungs, abdominal organs, bone, soft tissue, nodes  to follow separately     I have personally reviewed the examination and initial interpretation  and I agree with the findings.     DARRIUS CARDOSO MD          Laboratory Studies:  Personally reviewed and interpreted by me.     CBC RESULTS:        Lab Results   Component Value Date     WBC 5.5 10/09/2023     RBC 4.43 10/09/2023     HGB 12.8 10/09/2023     HGB 13.6 10/09/2023     HCT 41.0 10/09/2023     MCV 93 10/09/2023     MCH 30.7 10/09/2023     MCHC 33.2 10/09/2023     RDW 12.6 10/09/2023      (L) 10/09/2023         BMP RESULTS:        Lab Results   Component Value Date      10/09/2023     POTASSIUM 5.0 10/09/2023     CHLORIDE 97 (L) 10/09/2023     CO2 27 10/09/2023     ANIONGAP 13 10/09/2023      (H) 10/09/2023     BUN 27.5 (H) 10/09/2023     CR 0.70 10/09/2023     GFRESTIMATED 87 10/09/2023     BUSHRA 9.9 10/09/2023         A1C RESULTS:  No results found for: \"A1C\"     INR RESULTS:        Lab Results   Component Value Date     INR 1.11 10/09/2023         Assessment and Plan   Lacey Casraez is a 78 year old female with severe symptomatic aortic stenosis. " Given her older age, she is at moderate risk for adverse outcomes after surgical aortic valve replacement and should be considered for TAVR. She understands the risks and benefits of TAVR and is willing to proceed. She wishes to be considered for TAVR bailout and understands the risks.    Please do not hesitate to contact me if you have any questions/concerns.     Sincerely,     Joni Duarte MD

## 2023-10-23 NOTE — PROGRESS NOTES
STRUCTURAL HEART CLINIC  H&P    Referring Provider: Dr. Heredia     History of Present Illness  Lacey Casarez is a 78 year old female who presents for pre-operative H&P in preparation for transcatheter aortic valve replacement on 10/25/23 at [facility].     Patient with a history of severe aortic stenosis, characterized with an area of 0.58 cm2, mean gradient 61 mmHg and peak velocity of 5.5 m/sec with LVEF 60% by echocardiogram on 9/25/23. She reports symptoms of exertional dyspnea. Patient was evaluated in structural heart clinic where she was deemed an appropriate TAVR candidate. She was counseled for the above procedure.        Additional medical history is otherwise notable for HTN, HLD, pre diabetes.    History of blood transfusions/reactions: No  History of abnormal bleeding/anti-platelet use: No  Steroid use in the last year: No  Personal or family history with difficulty with anesthesia: No   Infection precautions: No  Difficulty w/intubation/bedrest: No      Current Medications:  Current Outpatient Medications   Medication Sig Dispense Refill    acetaminophen (TYLENOL) 500 MG tablet Maximum acetaminophen dose is 4000 mg in 24 hours      albuterol (PROAIR HFA/PROVENTIL HFA/VENTOLIN HFA) 108 (90 Base) MCG/ACT inhaler Inhale 1-2 Puffs every 6 hours as needed for Wheezing.      aspirin (ASA) 325 MG EC tablet Take 325 mg by mouth daily      atorvastatin (LIPITOR) 20 MG tablet Take 20 mg by mouth daily      blood glucose (ONETOUCH VERIO IQ) test strip TEST BLOOD SUGAR ONCE WEEKLY.      fluticasone (FLOVENT HFA) 44 MCG/ACT inhaler Inhale 2 puffs into the lungs      hydrochlorothiazide (HYDRODIURIL) 25 MG tablet Take 25 mg by mouth daily      lisinopril (ZESTRIL) 40 MG tablet Take 40 mg by mouth daily      metoprolol succinate ER (TOPROL XL) 100 MG 24 hr tablet Take 100 mg by mouth daily         Allergies:   No Known Allergies    Past Medical History:  No past medical history  on file.    Past Surgical History:  Past Surgical History:   Procedure Laterality Date    CV CORONARY ANGIOGRAM N/A 10/9/2023    Procedure: Coronary Angiogram;  Surgeon: Nick Lagos MD;  Location:  HEART CARDIAC CATH LAB    CV RIGHT HEART CATH MEASUREMENTS RECORDED N/A 10/9/2023    Procedure: Right Heart Catheterization;  Surgeon: Nick Lagos MD;  Location:  HEART CARDIAC CATH LAB       Family History:  No family history on file.    Social History:  Social History     Socioeconomic History    Marital status:      Spouse name: None    Number of children: None    Years of education: None    Highest education level: None   Tobacco Use    Smoking status: Never    Smokeless tobacco: Never       Review of Systems:    Functional status: Independent in ADL's. Able to achieve METS > 4     Constitutional: No fever, chills, or sweats. No weight gain/loss   ENT: No visual disturbance, ear ache, epistaxis, sore throat  Allergies/Immunologic: Negative.   Respiratory: No cough, hemoptysia  Cardiovascular: As per HPI  GI: No nausea, vomiting, hematemesis, melena, or hematochezia  : No urinary frequency, dysuria, or hematuria  Integument: Negative  Psychiatric: Negative  Neuro: Negative  Endocrinology: Negative   Musculoskeletal: Negative      Physical Exam:  Vitals: BP (!) 150/87 (BP Location: Left arm, Patient Position: Chair, Cuff Size: Adult Large)   Pulse 66   Wt 72 kg (158 lb 12.8 oz)   SpO2 99%   BMI 29.04 kg/m     General: NAD  HEENT:  Dentition intact.    Neck: No jugular venous distension.   Heart: RRR with 3/6 MARYELLEN at RUSB  Lungs: CTA.    Abdomen: Soft, nontender, nondistended.   Extremities: No clubbing, cyanosis, or edema.  The pulses are +4/4 at the radial, brachial, femoral, popliteal, DP, and PT sites bilaterally.  No bruits are noted.  Neurologic: Alert and oriented to person/place/time, normal speech, gait and affect  Skin: No petechiae, purpura or  rash.      Diagnostic Studies:    ECG 10/23/23:  Personally reviewed and interpreted by me.  NSR with HR 70s, PVCs noted, normal QRSd    ECHO 10/9/23:  Interpretation Summary  Severe aortic stenosis is present.  Global and regional left ventricular function is normal with an EF of 55-60%.  Grade II or moderate diastolic dysfunction.  Global right ventricular function is normal.  IVC diameter <2.1 cm collapsing >50% with sniff suggests a normal RA pressure  of 3 mmHg.  No pericardial effusion is present.  There is no prior study for direct comparison.  ______________________________________________________________________________  Left Ventricle  Global and regional left ventricular function is normal with an EF of 55-60%.  Left ventricular size is normal. Relative wall thickness is increased  consistent with concentric remodeling. Grade II or moderate diastolic  dysfunction.     Right Ventricle  The right ventricle is normal size. Global right ventricular function is  normal.     Atria  The right atria appears normal. Mild left atrial enlargement is present.     Mitral Valve  Mild mitral annular calcification is present. Trace mitral insufficiency is  present.     Aortic Valve  Severe aortic valve calcification is present. Mild aortic insufficiency is  present. Severe aortic stenosis is present. The calculated aortic valve are is  0.88 cm^2. The mean AoV pressure gradient is 50.0 mmHg. The peak aortic  velocity is 4.7 m/sec.     Tricuspid Valve  The tricuspid valve is normal. Trace tricuspid insufficiency is present. The  peak velocity of the tricuspid regurgitant jet is not obtainable.     Pulmonic Valve  The pulmonic valve is normal. Trace pulmonic insufficiency is present.     Vessels  The aorta root is normal. IVC diameter <2.1 cm collapsing >50% with sniff  suggests a normal RA pressure of 3 mmHg.     Pericardium  No pericardial effusion is present.     Compared to Previous Study  There is no prior study for  direct comparison.      CT TAVR 10/6/23:  Correct LVOT dimensions are as follows:  1.  The average LVOT diameter (measured 4 mm below annular plane) is  22.3 mm  2.  LVOT area is 3.92 cm2  3.  LVOT perimeter is 72.9 mm     JAYA CARDOSO MD         SYSTEM ID:  [id number]   Addended by Jaya Cardoso MD on 10/12/2023  7:48 AM     Study Result    Narrative & Impression   Procedure: CT ANGIOGRAM TAVR   Examination Date: 10/6/2023 10:18 AM   Indication: Severe aortic stenosis, Pre TAVR  Ordering Provider: CASI MAST     TECHNIQUE: ECG gated CT of the heart, aorta and nongated CT of the  chest abdomen pelvis without and with IV contrast 110 mL was performed  per the SIERRA protocol on a Siemens Dual Source Flash scanner without  incident. A noncontrast CT of the chest abdomen and pelvis was  performed followed by contrast-enhanced CTA of the heart in a spiral  gated mode with limited field-of-view followed by gated high pitch  spiral CTA of the chest, abdomen, pelvis at 120 kVP to evaluate the  thoracoabdominal aorta and iliac vasculature. Scan protocol was  optimized to minimize radiation exposure. The total radiation exposure  was 1254 DLP and 17.6 mSv. Images were reconstructed and analyzed on a  [employer] Workstation.                                                                       IMPRESSION:     1.  Severely calcified tricuspid aortic valve.   2.  See below for TAVR related aortic annular and vascular  measurements.   3.  No acute aortic dissection, intramural hematoma or contained  rupture noted.  4.  Please review detailed radiology report, to follow separately, for  incidental non-cardiovascular findings.     FINDINGS:     1.  Severely calcified tricuspidaortic valve. Aortic valve calcium  score is 2018. The LVOT is not calcified. The ascending aorta is not  calcified, aortic arch is  mildly calcified, the descending thoracic  aorta is mildly calcified. There is no mitral annular  calcification.  2.  There are no adverse aortic root features, i.e. coronary heights  are adequate and there is no significant aortic root calcification.  3.  There are significant native coronary calcifications.   4.  The arch vessel branching pattern is normal variantt. There is a  single common ostium of the innominate and left common carotid  arteries.   5.  The suprarenal abdominal aorta is moderately calcified; infrarenal  abdominal aorta is severely calcified  6.  Widely patent origins of celiac trunk, superior mesenteric artery  and inferior mesenteric artery.  Single bilateral renal arteries with  widely patent origins.   7.  There is no acute aortic pathology, such as dissection, intramural  hematoma, or contained rupture.      MEASUREMENTS:   Representative dimensions of the thoracoabdominal aorta are as  follows:     1. AORTIC ANNULUS MEASUREMENTS:     1.  The average aortic annulus diameter is 22.3 mm, (long diameter is  26.3 mm and short diameter is 19.7 mm)  2.  Aortic annulus area is 3.92 cm2  3.  Aortic annulus perimeter is 72.1 mm  4.  Suggested 3-cusp coaxial angle for aortic valve is (LAO12 , CAU 7)  and alternate A-P coaxial angle is (LAO0 , CAU19 ), these  angles will  vary depending upon the patient's body position  5.  Aortic root angle is 44.8 degrees  6.  Left main - Annulus distance: 11.0 mm, RCA - Annulus distance:  14.4 mm     2. LVOT MEASUREMENTS:     1.  The average LVOT diameter (measured 4 mm below annular plane) is  21.8 mm  2.  LVOT area is 3.72 cm2  3.  LVOT perimeter is 70.7 mm     3. AORTA MEASUREMENTS     1.  The aortic root at the sinuses of Valsalva (left cusp, right cusp,  non cusp): 27.7 mm x  26  2.  3 mm x 28.1 mm  3.  The sinotubular junction:  23.7 mm x 22.7 mm  4.  Sinotubular junction height: 20.1 mm x 17.9 mm  5.  Proximal ascending aorta: 29.9 mm x 28.3 mm  6.  Distal abdominal aorta proximal to the bifurcation: 13.4 mm x 11.5  mm  7.  Innominate artery 3 cm from  "ostium: 11.4 mm x 10.8 mm  8.  Left common carotid artery 3 cm from ostium: 6.8 mm x 6.1 mm     4. ILIOFEMORAL MEASUREMENTS:     RIGHT:  1.  Right common iliac artery: 9.3 mm x 7.8 mm   2.  Right external iliac artery: 7.4 mm x 7.0 mm   3.  Right common femoral artery: 7.9 mm x 6.6 mm   4.  Tortuosity: mild   5.  Calcification: mild      LEFT:  1.  Left common iliac artery: 10.0 mm x  9.7 mm  2.  Left external iliac artery: 7.1 mm x 6.5 mm  3.  Left common femoral artery: 7.2 mm x 7.1 mm  4.  Tortuosity: mild   5.  Calcification: mild      5. SUBCLAVIAN MEASUREMENTS:     RIGHT:  1. Minimal luminal diameter: 6.6 mm x  5.2 mm  2. Tortuosity: moderate   3. Calcification: mild      LEFT:  1. Minimal luminal diameter: 6.7 mm x  5.4 mm  2. Tortuosity: mild   3. Calcification: mild      OTHER FINDINGS:   1.  Please review radiology review for incidental non-cardiovascular  findings including lungs, abdominal organs, bone, soft tissue, nodes  to follow separately     I have personally reviewed the examination and initial interpretation  and I agree with the findings.     DARRIUS CARDOSO MD         Laboratory Studies:  Personally reviewed and interpreted by me.    CBC RESULTS:  Lab Results   Component Value Date    WBC 5.5 10/09/2023    RBC 4.43 10/09/2023    HGB 12.8 10/09/2023    HGB 13.6 10/09/2023    HCT 41.0 10/09/2023    MCV 93 10/09/2023    MCH 30.7 10/09/2023    MCHC 33.2 10/09/2023    RDW 12.6 10/09/2023     (L) 10/09/2023       BMP RESULTS:  Lab Results   Component Value Date     10/09/2023    POTASSIUM 5.0 10/09/2023    CHLORIDE 97 (L) 10/09/2023    CO2 27 10/09/2023    ANIONGAP 13 10/09/2023     (H) 10/09/2023    BUN 27.5 (H) 10/09/2023    CR 0.70 10/09/2023    GFRESTIMATED 87 10/09/2023    BUSHRA 9.9 10/09/2023        A1C RESULTS:  No results found for: \"A1C\"    INR RESULTS:  Lab Results   Component Value Date    INR 1.11 10/09/2023       Assessment and Plan   Lacey MACKEY Leida is a 78 year " old female who presents for pre-operative H&P in preparation for transcatheter aortic valve replacement on 10/25/23 at [facility].     She has the following specific operative considerations:      - RCRI score 1 corresponding with a 1% perioperative risk of MACE      - BRYON # of risks 2/8      - VTE risk = 1. If equal to or > 4, pharmacologic prophylaxis is indicated      - RIsk of PONV score = 2. If > 2, anti-emetic intervention recommended    1. Severe aortic valve stenosis:   2. Chronic diastolic heart failure, valvular, NYHA class II:  Patient found to have a murmur on exam and reported mild exertional dyspnea prompting echo which showed severe aortic stenosis. She has been evaluated by the our structural heart team and has been deemed an appropriate candidate for transcatheter aortic valve replacement. We discussed the procedure in detail, including pre and post-procedure care, restrictions and follow-up.     All questions answered  Type and screen orders complete  Supplies for scrubbing provided  No known contrast dye allergy   No trouble with anesthesia in the past  Labs today stable, no s/s of infection    3. CAD:   4. HTN  5. HLD  Pre-TAVR coronary angiogram showed mild CAD. Plan to continue medical therapy.   - Continue  mg and atorvastatin DOS   - Hold lisinopril, hydrochlorothiazide and metoprolol DOS  - Hold all supplements DOS    Patient is optimized and is acceptable candidate for the proposed procedure.  No further diagnostic evaluation is needed. Pre-procedure instructions provided in written format.     HONEY Preston, CNP  Structural Heart Care Nurse Practitioner  [facility]  Clinic: [phone]  Pager: [phone]

## 2023-10-23 NOTE — PATIENT INSTRUCTIONS
TAVR Procedure:  Wednesday, October 25, 2nd case     Check in to the hospital, admitting area on the main floor at 8:00 a.m.     [facility] [address]  [address] [place]  [address]     -   parking is available in front of the hospital, 24 hours a day  -  Stop at the Information Desk and the admissions desk in the lobby.        * Please check in at 8:00 a.m.  You will be directed to the Family Surgery Waiting Room, on the 3rd Floor. If you need assistance in walking, please inform people at the time of check in.  * After leaving the registration check-in area, there will be TWO visitors allowed to the pre-op area.  * When patients leave the pre-op area for their procedure, the visitors have the option to stay and wait in the family waiting area or leave the hospital.   * After the procedure is finished, the MD will call the visitor, or see them face-to-face, and update them on the procedure.     * When the patient arrives to the unit where they will stay until discharge, up to four visitors are allowed to come visit at that time.     -------------------------------------------------------------------------  Nothing to eat after midnight; water, apple juice or 7up/Sprite is OK up to two hours prior to your scheduled procedure.  You may take your medications in the morning with a sip of water.        *Please use the special cleansing wipes, received at your pre-op History and Physical, the night before and the morning of your procedure.  Please focus the use of these wipes from neck to groin, avoiding the face and genital area.        If you did NOT receive these special cleansing wipes at your pre-op History and Physical, then:   * Please use a soap such as hibicleanse or chlorhexadine.  This can be purchased, over the counter, at a pharmacy.  If this is not available, please use an antibacterial soap.  * Take a shower, with antibacterial soap, the night before the  procedure, and the morning of the procedure.  Focus the use of this soap from neck to groin, avoiding the face and genital area.     Medications Instructions:  -- PLEASE TAKE: Aspirin 325 mg, by mouth the night before the procedure and the morning of the procedure.     -- OK to take morning of procedure, or, night before the procedure, it that is how it's prescribed:  acetaminophen (if needed)  albuterol    atorvastatin    fluticasone       -- HOLD these medications the morning of the procedure:  hydrochlorothiazide    lisinopril    metoprolol succinate          If any questions please contact:  Felicia Zayas RN  Structural Heart Care Coordinator  [facility]  Office: [phone]  Pager: [phone]

## 2023-10-23 NOTE — LETTER
10/23/2023      RE: Lacey Casarez  [address]  [facility] [address]       Dear Colleague,    Thank you for the opportunity to participate in the care of your patient, Lacey Casarez, at the [facility] [place] [facility]. Please see a copy of my visit note below.        STRUCTURAL HEART CLINIC  H&P    Referring Provider: Dr. Heredia     History of Present Illness  Lacey Casarez is a 78 year old female who presents for pre-operative H&P in preparation for transcatheter aortic valve replacement on 10/25/23 at [facility].     Patient with a history of severe aortic stenosis, characterized with an area of 0.58 cm2, mean gradient 61 mmHg and peak velocity of 5.5 m/sec with LVEF 60% by echocardiogram on 9/25/23. She reports symptoms of exertional dyspnea. Patient was evaluated in structural heart clinic where she was deemed an appropriate TAVR candidate. She was counseled for the above procedure.        Additional medical history is otherwise notable for HTN, HLD, pre diabetes.    History of blood transfusions/reactions: No  History of abnormal bleeding/anti-platelet use: No  Steroid use in the last year: No  Personal or family history with difficulty with anesthesia: No   Infection precautions: No  Difficulty w/intubation/bedrest: No      Current Medications:  Current Outpatient Medications   Medication Sig Dispense Refill    acetaminophen (TYLENOL) 500 MG tablet Maximum acetaminophen dose is 4000 mg in 24 hours      albuterol (PROAIR HFA/PROVENTIL HFA/VENTOLIN HFA) 108 (90 Base) MCG/ACT inhaler Inhale 1-2 Puffs every 6 hours as needed for Wheezing.      aspirin (ASA) 325 MG EC tablet Take 325 mg by mouth daily      atorvastatin (LIPITOR) 20 MG tablet Take 20 mg by mouth daily      blood glucose (ONETOUCH VERIO IQ) test strip TEST BLOOD SUGAR ONCE WEEKLY.      fluticasone (FLOVENT HFA) 44 MCG/ACT inhaler Inhale  2 puffs into the lungs      hydrochlorothiazide (HYDRODIURIL) 25 MG tablet Take 25 mg by mouth daily      lisinopril (ZESTRIL) 40 MG tablet Take 40 mg by mouth daily      metoprolol succinate ER (TOPROL XL) 100 MG 24 hr tablet Take 100 mg by mouth daily         Allergies:   No Known Allergies    Past Medical History:  No past medical history on file.    Past Surgical History:  Past Surgical History:   Procedure Laterality Date    CV CORONARY ANGIOGRAM N/A 10/9/2023    Procedure: Coronary Angiogram;  Surgeon: Nick Lagos MD;  Location:  HEART CARDIAC CATH LAB    CV RIGHT HEART CATH MEASUREMENTS RECORDED N/A 10/9/2023    Procedure: Right Heart Catheterization;  Surgeon: Nick Lagos MD;  Location:  HEART CARDIAC CATH LAB       Family History:  No family history on file.    Social History:  Social History     Socioeconomic History    Marital status:      Spouse name: None    Number of children: None    Years of education: None    Highest education level: None   Tobacco Use    Smoking status: Never    Smokeless tobacco: Never       Review of Systems:    Functional status: Independent in ADL's. Able to achieve METS > 4     Constitutional: No fever, chills, or sweats. No weight gain/loss   ENT: No visual disturbance, ear ache, epistaxis, sore throat  Allergies/Immunologic: Negative.   Respiratory: No cough, hemoptysia  Cardiovascular: As per HPI  GI: No nausea, vomiting, hematemesis, melena, or hematochezia  : No urinary frequency, dysuria, or hematuria  Integument: Negative  Psychiatric: Negative  Neuro: Negative  Endocrinology: Negative   Musculoskeletal: Negative      Physical Exam:  Vitals: BP (!) 150/87 (BP Location: Left arm, Patient Position: Chair, Cuff Size: Adult Large)   Pulse 66   Wt 72 kg (158 lb 12.8 oz)   SpO2 99%   BMI 29.04 kg/m     General: NAD  HEENT:  Dentition intact.    Neck: No jugular venous distension.   Heart: RRR with 3/6 MARYELLEN at RUSB  Lungs:  CTA.    Abdomen: Soft, nontender, nondistended.   Extremities: No clubbing, cyanosis, or edema.  The pulses are +4/4 at the radial, brachial, femoral, popliteal, DP, and PT sites bilaterally.  No bruits are noted.  Neurologic: Alert and oriented to person/place/time, normal speech, gait and affect  Skin: No petechiae, purpura or rash.      Diagnostic Studies:    ECG 10/23/23:  Personally reviewed and interpreted by me.  NSR with HR 70s, PVCs noted, normal QRSd    ECHO 10/9/23:  Interpretation Summary  Severe aortic stenosis is present.  Global and regional left ventricular function is normal with an EF of 55-60%.  Grade II or moderate diastolic dysfunction.  Global right ventricular function is normal.  IVC diameter <2.1 cm collapsing >50% with sniff suggests a normal RA pressure  of 3 mmHg.  No pericardial effusion is present.  There is no prior study for direct comparison.  ______________________________________________________________________________  Left Ventricle  Global and regional left ventricular function is normal with an EF of 55-60%.  Left ventricular size is normal. Relative wall thickness is increased  consistent with concentric remodeling. Grade II or moderate diastolic  dysfunction.     Right Ventricle  The right ventricle is normal size. Global right ventricular function is  normal.     Atria  The right atria appears normal. Mild left atrial enlargement is present.     Mitral Valve  Mild mitral annular calcification is present. Trace mitral insufficiency is  present.     Aortic Valve  Severe aortic valve calcification is present. Mild aortic insufficiency is  present. Severe aortic stenosis is present. The calculated aortic valve are is  0.88 cm^2. The mean AoV pressure gradient is 50.0 mmHg. The peak aortic  velocity is 4.7 m/sec.     Tricuspid Valve  The tricuspid valve is normal. Trace tricuspid insufficiency is present. The  peak velocity of the tricuspid regurgitant jet is not obtainable.      Pulmonic Valve  The pulmonic valve is normal. Trace pulmonic insufficiency is present.     Vessels  The aorta root is normal. IVC diameter <2.1 cm collapsing >50% with sniff  suggests a normal RA pressure of 3 mmHg.     Pericardium  No pericardial effusion is present.     Compared to Previous Study  There is no prior study for direct comparison.      CT TAVR 10/6/23:  Correct LVOT dimensions are as follows:  1.  The average LVOT diameter (measured 4 mm below annular plane) is  22.3 mm  2.  LVOT area is 3.92 cm2  3.  LVOT perimeter is 72.9 mm     JAYA CARDOSO MD         SYSTEM ID:  [id number]   Addended by Jaya Cardoso MD on 10/12/2023  7:48 AM     Study Result    Narrative & Impression   Procedure: CT ANGIOGRAM TAVR   Examination Date: 10/6/2023 10:18 AM   Indication: Severe aortic stenosis, Pre TAVR  Ordering Provider: CASI MAST     TECHNIQUE: ECG gated CT of the heart, aorta and nongated CT of the  chest abdomen pelvis without and with IV contrast 110 mL was performed  per the SIERRA protocol on a Siemens Dual Source Flash scanner without  incident. A noncontrast CT of the chest abdomen and pelvis was  performed followed by contrast-enhanced CTA of the heart in a spiral  gated mode with limited field-of-view followed by gated high pitch  spiral CTA of the chest, abdomen, pelvis at 120 kVP to evaluate the  thoracoabdominal aorta and iliac vasculature. Scan protocol was  optimized to minimize radiation exposure. The total radiation exposure  was 1254 DLP and 17.6 mSv. Images were reconstructed and analyzed on a  [employer] Workstation.                                                                       IMPRESSION:     1.  Severely calcified tricuspid aortic valve.   2.  See below for TAVR related aortic annular and vascular  measurements.   3.  No acute aortic dissection, intramural hematoma or contained  rupture noted.  4.  Please review detailed radiology report, to follow separately,  for  incidental non-cardiovascular findings.     FINDINGS:     1.  Severely calcified tricuspidaortic valve. Aortic valve calcium  score is 2018. The LVOT is not calcified. The ascending aorta is not  calcified, aortic arch is  mildly calcified, the descending thoracic  aorta is mildly calcified. There is no mitral annular calcification.  2.  There are no adverse aortic root features, i.e. coronary heights  are adequate and there is no significant aortic root calcification.  3.  There are significant native coronary calcifications.   4.  The arch vessel branching pattern is normal variantt. There is a  single common ostium of the innominate and left common carotid  arteries.   5.  The suprarenal abdominal aorta is moderately calcified; infrarenal  abdominal aorta is severely calcified  6.  Widely patent origins of celiac trunk, superior mesenteric artery  and inferior mesenteric artery.  Single bilateral renal arteries with  widely patent origins.   7.  There is no acute aortic pathology, such as dissection, intramural  hematoma, or contained rupture.      MEASUREMENTS:   Representative dimensions of the thoracoabdominal aorta are as  follows:     1. AORTIC ANNULUS MEASUREMENTS:     1.  The average aortic annulus diameter is 22.3 mm, (long diameter is  26.3 mm and short diameter is 19.7 mm)  2.  Aortic annulus area is 3.92 cm2  3.  Aortic annulus perimeter is 72.1 mm  4.  Suggested 3-cusp coaxial angle for aortic valve is (LAO12 , CAU 7)  and alternate A-P coaxial angle is (LAO0 , CAU19 ), these  angles will  vary depending upon the patient's body position  5.  Aortic root angle is 44.8 degrees  6.  Left main - Annulus distance: 11.0 mm, RCA - Annulus distance:  14.4 mm     2. LVOT MEASUREMENTS:     1.  The average LVOT diameter (measured 4 mm below annular plane) is  21.8 mm  2.  LVOT area is 3.72 cm2  3.  LVOT perimeter is 70.7 mm     3. AORTA MEASUREMENTS     1.  The aortic root at the sinuses of Valsalva (left  cusp, right cusp,  non cusp): 27.7 mm x  26  2.  3 mm x 28.1 mm  3.  The sinotubular junction:  23.7 mm x 22.7 mm  4.  Sinotubular junction height: 20.1 mm x 17.9 mm  5.  Proximal ascending aorta: 29.9 mm x 28.3 mm  6.  Distal abdominal aorta proximal to the bifurcation: 13.4 mm x 11.5  mm  7.  Innominate artery 3 cm from ostium: 11.4 mm x 10.8 mm  8.  Left common carotid artery 3 cm from ostium: 6.8 mm x 6.1 mm     4. ILIOFEMORAL MEASUREMENTS:     RIGHT:  1.  Right common iliac artery: 9.3 mm x 7.8 mm   2.  Right external iliac artery: 7.4 mm x 7.0 mm   3.  Right common femoral artery: 7.9 mm x 6.6 mm   4.  Tortuosity: mild   5.  Calcification: mild      LEFT:  1.  Left common iliac artery: 10.0 mm x  9.7 mm  2.  Left external iliac artery: 7.1 mm x 6.5 mm  3.  Left common femoral artery: 7.2 mm x 7.1 mm  4.  Tortuosity: mild   5.  Calcification: mild      5. SUBCLAVIAN MEASUREMENTS:     RIGHT:  1. Minimal luminal diameter: 6.6 mm x  5.2 mm  2. Tortuosity: moderate   3. Calcification: mild      LEFT:  1. Minimal luminal diameter: 6.7 mm x  5.4 mm  2. Tortuosity: mild   3. Calcification: mild      OTHER FINDINGS:   1.  Please review radiology review for incidental non-cardiovascular  findings including lungs, abdominal organs, bone, soft tissue, nodes  to follow separately     I have personally reviewed the examination and initial interpretation  and I agree with the findings.     DARRIUS CARDOSO MD         Laboratory Studies:  Personally reviewed and interpreted by me.    CBC RESULTS:  Lab Results   Component Value Date    WBC 5.5 10/09/2023    RBC 4.43 10/09/2023    HGB 12.8 10/09/2023    HGB 13.6 10/09/2023    HCT 41.0 10/09/2023    MCV 93 10/09/2023    MCH 30.7 10/09/2023    MCHC 33.2 10/09/2023    RDW 12.6 10/09/2023     (L) 10/09/2023       BMP RESULTS:  Lab Results   Component Value Date     10/09/2023    POTASSIUM 5.0 10/09/2023    CHLORIDE 97 (L) 10/09/2023    CO2 27 10/09/2023    ANIONGAP  "13 10/09/2023     (H) 10/09/2023    BUN 27.5 (H) 10/09/2023    CR 0.70 10/09/2023    GFRESTIMATED 87 10/09/2023    BUSHRA 9.9 10/09/2023        A1C RESULTS:  No results found for: \"A1C\"    INR RESULTS:  Lab Results   Component Value Date    INR 1.11 10/09/2023       Assessment and Plan   Lacey Casarez is a 78 year old female who presents for pre-operative H&P in preparation for transcatheter aortic valve replacement on 10/25/23 at [facility].     She has the following specific operative considerations:      - RCRI score 1 corresponding with a 1% perioperative risk of MACE      - BRYON # of risks 2/8      - VTE risk = 1. If equal to or > 4, pharmacologic prophylaxis is indicated      - RIsk of PONV score = 2. If > 2, anti-emetic intervention recommended    1. Severe aortic valve stenosis:   2. Chronic diastolic heart failure, valvular, NYHA class II:  Patient found to have a murmur on exam and reported mild exertional dyspnea prompting echo which showed severe aortic stenosis. She has been evaluated by the our structural heart team and has been deemed an appropriate candidate for transcatheter aortic valve replacement. We discussed the procedure in detail, including pre and post-procedure care, restrictions and follow-up.     All questions answered  Type and screen orders complete  Supplies for scrubbing provided  No known contrast dye allergy   No trouble with anesthesia in the past  Labs today stable, no s/s of infection    3. CAD:   4. HTN  5. HLD  Pre-TAVR coronary angiogram showed mild CAD. Plan to continue medical therapy.   - Continue  mg and atorvastatin DOS   - Hold lisinopril, hydrochlorothiazide and metoprolol DOS  - Hold all supplements DOS    Patient is optimized and is acceptable candidate for the proposed procedure.  No further diagnostic evaluation is needed. Pre-procedure instructions provided in written format.     HONEY Preston, CNP  Structural Heart " Care Nurse Practitioner  [facility]  Clinic: [phone]  Pager: [phone]

## 2023-10-24 ENCOUNTER — ANESTHESIA EVENT (OUTPATIENT)
Dept: SURGERY | Facility: CLINIC | Age: 78
DRG: 266 | End: 2023-10-24
Payer: COMMERCIAL

## 2023-10-24 LAB
ATRIAL RATE - MUSE: 73 BPM
DIASTOLIC BLOOD PRESSURE - MUSE: NORMAL MMHG
INTERPRETATION ECG - MUSE: NORMAL
P AXIS - MUSE: 56 DEGREES
PR INTERVAL - MUSE: 142 MS
QRS DURATION - MUSE: 78 MS
QT - MUSE: 388 MS
QTC - MUSE: 427 MS
R AXIS - MUSE: 37 DEGREES
SYSTOLIC BLOOD PRESSURE - MUSE: NORMAL MMHG
T AXIS - MUSE: -30 DEGREES
VENTRICULAR RATE- MUSE: 73 BPM

## 2023-10-24 NOTE — ANESTHESIA PREPROCEDURE EVALUATION
Anesthesia Pre-Procedure Evaluation    Patient: Lacey Casarez   MRN: [id number] : 1945        Procedure : Procedure(s):  Transcatheter aortic valve replacement with any indicated procedure- Guevara.  OR TRANSCATHETER AORTIC VALVE REPLACEMENT, OPEN FEMORAL ARTERY APPROACH- Guevara          History reviewed. No pertinent past medical history.   Past Surgical History:   Procedure Laterality Date    CV CORONARY ANGIOGRAM N/A 10/9/2023    Procedure: Coronary Angiogram;  Surgeon: Nick Lagos MD;  Location: [facility] CARDIAC CATH LAB    CV RIGHT HEART CATH MEASUREMENTS RECORDED N/A 10/9/2023    Procedure: Right Heart Catheterization;  Surgeon: Nick Lagos MD;  Location:  HEART CARDIAC CATH LAB      No Known Allergies   Social History     Tobacco Use    Smoking status: Never    Smokeless tobacco: Never   Substance Use Topics    Alcohol use: Not on file      Wt Readings from Last 1 Encounters:   10/23/23 72 kg (158 lb 11.7 oz)        Anesthesia Evaluation   Pt has not had prior anesthetic         ROS/MED HX  ENT/Pulmonary:     (+)                     asthma  Treatment: Inhaler prn,                 Neurologic:  - neg neurologic ROS     Cardiovascular:     (+) Dyslipidemia hypertension- -  CAD -  - -                           valvular problems/murmurs type: AS Severe AS.    Previous cardiac testing   Echo: Date: 10/9/23 Results:  nterpretation Summary  Severe aortic stenosis is present.  Global and regional left ventricular function is normal with an EF of 55-60%.  Grade II or moderate diastolic dysfunction.  Global right ventricular function is normal.  IVC diameter <2.1 cm collapsing >50% with sniff suggests a normal RA pressure  of 3 mmHg.  No pericardial effusion is present.  There is no prior study for direct comparison.    Stress Test:  Date: Results:    ECG Reviewed:  Date: Results:    Cath:  Date: 10/9/23 Results:     Right sided filling pressures are normal.     Left  "sided filling pressures are normal.     Mild elevated pulmonary hypertension.     Normal cardiac output level.     Hemodynamic data has been modified in Epic per physician review.     Mild non obstructive CAD.      METS/Exercise Tolerance:     Hematologic:  - neg hematologic  ROS     Musculoskeletal:  - neg musculoskeletal ROS     GI/Hepatic:  - neg GI/hepatic ROS     Renal/Genitourinary:  - neg Renal ROS     Endo:  - neg endo ROS     Psychiatric/Substance Use:  - neg psychiatric ROS     Infectious Disease:  - neg infectious disease ROS     Malignancy:  - neg malignancy ROS     Other:  - neg other ROS          Physical Exam    Airway  airway exam normal      Mallampati: I   TM distance: > 3 FB   Neck ROM: full   Mouth opening: > 3 cm    Respiratory Devices and Support         Dental       (+) Minor Abnormalities - some fillings, tiny chips      Cardiovascular   cardiovascular exam normal       Rhythm and rate: regular and normal     Pulmonary   pulmonary exam normal        breath sounds clear to auscultation           OUTSIDE LABS:  CBC:   Lab Results   Component Value Date    WBC 5.4 10/23/2023    WBC 5.5 10/09/2023    HGB 12.8 10/23/2023    HGB 12.8 10/09/2023    HCT 38.5 10/23/2023    HCT 41.0 10/09/2023     (L) 10/23/2023     (L) 10/09/2023     BMP:   Lab Results   Component Value Date     10/23/2023     10/09/2023    POTASSIUM 3.8 10/23/2023    POTASSIUM 5.0 10/09/2023    CHLORIDE 100 10/23/2023    CHLORIDE 97 (L) 10/09/2023    CO2 28 10/23/2023    CO2 27 10/09/2023    BUN 19.3 10/23/2023    BUN 27.5 (H) 10/09/2023    CR 0.60 10/23/2023    CR 0.70 10/09/2023     (H) 10/23/2023     (H) 10/09/2023     COAGS:   Lab Results   Component Value Date    INR 1.15 10/23/2023     POC: No results found for: \"BGM\", \"HCG\", \"HCGS\"  HEPATIC: No results found for: \"ALBUMIN\", \"PROTTOTAL\", \"ALT\", \"AST\", \"GGT\", \"ALKPHOS\", \"BILITOTAL\", \"BILIDIRECT\", \"BERNIE\"  OTHER:   Lab Results   Component " Value Date    BUSHRA 9.6 10/23/2023       Anesthesia Plan    ASA Status:  4    NPO Status:  NPO Appropriate    Anesthesia Type: MAC.     - Reason for MAC: chronic cardiopulmonary disease, immobility needed, straight local not clinically adequate   Induction: Intravenous.   Maintenance: TIVA.   Techniques and Equipment:     - Lines/Monitors: 2nd IV, Central Line, Arterial Line     Consents    Anesthesia Plan(s) and associated risks, benefits, and realistic alternatives discussed. Questions answered and patient/representative(s) expressed understanding.     - Discussed: Risks, Benefits and Alternatives for BOTH SEDATION and the PROCEDURE were discussed     - Discussed with:  Patient      - Extended Intubation/Ventilatory Support Discussed: Yes.      - Patient is DNR/DNI Status: No     Use of blood products discussed: Yes.     - Discussed with: Patient.     Postoperative Care    Pain management: IV analgesics, Oral pain medications, Multi-modal analgesia.   PONV prophylaxis: Ondansetron (or other 5HT-3), Dexamethasone or Solumedrol     Comments:    Other Comments: The material risks, benefits, and alternatives were discussed in detail.  The patient agrees to proceed.  The patient has no other complaints at this time.            Surjit Sharma MD

## 2023-10-24 NOTE — PROGRESS NOTES
History of Present Illness  Lacey Casarez is a 78 year old female who presents for evaluation of options for severe aortic stenosis     Patient with a history of severe aortic stenosis, characterized with an area of 0.58 cm2, mean gradient 61 mmHg and peak velocity of 5.5 m/sec with LVEF 60% by echocardiogram on 9/25/23. She reports symptoms of exertional dyspnea. Patient was evaluated in structural heart clinic where she was deemed an appropriate TAVR candidate.       Additional medical history is otherwise notable for HTN, HLD, pre diabetes.        Current Medications:         Current Outpatient Medications   Medication Sig Dispense Refill    acetaminophen (TYLENOL) 500 MG tablet Maximum acetaminophen dose is 4000 mg in 24 hours        albuterol (PROAIR HFA/PROVENTIL HFA/VENTOLIN HFA) 108 (90 Base) MCG/ACT inhaler Inhale 1-2 Puffs every 6 hours as needed for Wheezing.        aspirin (ASA) 325 MG EC tablet Take 325 mg by mouth daily        atorvastatin (LIPITOR) 20 MG tablet Take 20 mg by mouth daily        blood glucose (ONETOUCH VERIO IQ) test strip TEST BLOOD SUGAR ONCE WEEKLY.        fluticasone (FLOVENT HFA) 44 MCG/ACT inhaler Inhale 2 puffs into the lungs        hydrochlorothiazide (HYDRODIURIL) 25 MG tablet Take 25 mg by mouth daily        lisinopril (ZESTRIL) 40 MG tablet Take 40 mg by mouth daily        metoprolol succinate ER (TOPROL XL) 100 MG 24 hr tablet Take 100 mg by mouth daily                Allergies:   No Known Allergies     Past Medical History:  Past Medical History   No past medical history on file.        Past Surgical History:        Past Surgical History:   Procedure Laterality Date    CV CORONARY ANGIOGRAM N/A 10/9/2023     Procedure: Coronary Angiogram;  Surgeon: Nick Lagos MD;  Location:  [facility]    CV RIGHT HEART CATH MEASUREMENTS RECORDED N/A 10/9/2023     Procedure: Right Heart Catheterization;  Surgeon: Nick Lagos MD;   "Location:  [facility]            Family History:  Family History   No family history on file.        Social History\"           Socioeconomic History    Marital status:        Spouse name: None    Number of children: None    Years of education: None    Highest education level: None   Tobacco Use    Smoking status: Never    Smokeless tobacco: Never            Review of Systems:     Constitutional: No fever, chills, or sweats. No weight gain/loss   ENT: No visual disturbance, ear ache, epistaxis, sore throat  Allergies/Immunologic: Negative.   Respiratory: No cough, hemoptysia  Cardiovascular: As per HPI  GI: No nausea, vomiting, hematemesis, melena, or hematochezia  : No urinary frequency, dysuria, or hematuria  Integument: Negative  Psychiatric: Negative  Neuro: Negative  Endocrinology: Negative   Musculoskeletal: Negative        Physical Exam:  Vitals: BP (!) 150/87 (BP Location: Left arm, Patient Position: Chair, Cuff Size: Adult Large)   Pulse 66   Wt 72 kg (158 lb 12.8 oz)   SpO2 99%   BMI 29.04 kg/m     General: NAD  HEENT:  Dentition intact.    Neck: No jugular venous distension.   Heart: RRR with 3/6 MARYELLEN at RUSB  Lungs: CTA.    Abdomen: Soft, nontender, nondistended.   Extremities: No clubbing, cyanosis, or edema.  The pulses are +4/4 at the radial, brachial, femoral, popliteal, DP, and PT sites bilaterally.  No bruits are noted.  Neurologic: Alert and oriented to person/place/time, normal speech, gait and affect  Skin: No petechiae, purpura or rash.        Diagnostic Studies:     ECG 10/23/23:  Personally reviewed and interpreted by me.  NSR with HR 70s, PVCs noted, normal QRSd     ECHO 10/9/23:  Interpretation Summary  Severe aortic stenosis is present.  Global and regional left ventricular function is normal with an EF of 55-60%.  Grade II or moderate diastolic dysfunction.  Global right ventricular function is normal.  IVC diameter <2.1 cm collapsing >50% with sniff suggests a " normal RA pressure  of 3 mmHg.  No pericardial effusion is present.  There is no prior study for direct comparison.  ______________________________________________________________________________  Left Ventricle  Global and regional left ventricular function is normal with an EF of 55-60%.  Left ventricular size is normal. Relative wall thickness is increased  consistent with concentric remodeling. Grade II or moderate diastolic  dysfunction.     Right Ventricle  The right ventricle is normal size. Global right ventricular function is  normal.     Atria  The right atria appears normal. Mild left atrial enlargement is present.     Mitral Valve  Mild mitral annular calcification is present. Trace mitral insufficiency is  present.     Aortic Valve  Severe aortic valve calcification is present. Mild aortic insufficiency is  present. Severe aortic stenosis is present. The calculated aortic valve are is  0.88 cm^2. The mean AoV pressure gradient is 50.0 mmHg. The peak aortic  velocity is 4.7 m/sec.     Tricuspid Valve  The tricuspid valve is normal. Trace tricuspid insufficiency is present. The  peak velocity of the tricuspid regurgitant jet is not obtainable.     Pulmonic Valve  The pulmonic valve is normal. Trace pulmonic insufficiency is present.     Vessels  The aorta root is normal. IVC diameter <2.1 cm collapsing >50% with sniff  suggests a normal RA pressure of 3 mmHg.     Pericardium  No pericardial effusion is present.     Compared to Previous Study  There is no prior study for direct comparison.        CT TAVR 10/6/23:  Correct LVOT dimensions are as follows:  1.  The average LVOT diameter (measured 4 mm below annular plane) is  22.3 mm  2.  LVOT area is 3.92 cm2  3.  LVOT perimeter is 72.9 mm     JAYA CARDOSO MD         SYSTEM ID:  [id number]   Addended by Jaya Cardoso MD on 10/12/2023  7:48 AM      Study Result     Narrative & Impression   Procedure: CT ANGIOGRAM TAVR   Examination Date:  10/6/2023 10:18 AM   Indication: Severe aortic stenosis, Pre TAVR  Ordering Provider: CASI MAST     TECHNIQUE: ECG gated CT of the heart, aorta and nongated CT of the  chest abdomen pelvis without and with IV contrast 110 mL was performed  per the SIERRA protocol on a Siemens Dual Source Flash scanner without  incident. A noncontrast CT of the chest abdomen and pelvis was  performed followed by contrast-enhanced CTA of the heart in a spiral  gated mode with limited field-of-view followed by gated high pitch  spiral CTA of the chest, abdomen, pelvis at 120 kVP to evaluate the  thoracoabdominal aorta and iliac vasculature. Scan protocol was  optimized to minimize radiation exposure. The total radiation exposure  was 1254 DLP and 17.6 mSv. Images were reconstructed and analyzed on a  "Mobile Location, IP" Workstation.                                                                       IMPRESSION:     1.  Severely calcified tricuspid aortic valve.   2.  See below for TAVR related aortic annular and vascular  measurements.   3.  No acute aortic dissection, intramural hematoma or contained  rupture noted.  4.  Please review detailed radiology report, to follow separately, for  incidental non-cardiovascular findings.     FINDINGS:     1.  Severely calcified tricuspidaortic valve. Aortic valve calcium  score is 2018. The LVOT is not calcified. The ascending aorta is not  calcified, aortic arch is  mildly calcified, the descending thoracic  aorta is mildly calcified. There is no mitral annular calcification.  2.  There are no adverse aortic root features, i.e. coronary heights  are adequate and there is no significant aortic root calcification.  3.  There are significant native coronary calcifications.   4.  The arch vessel branching pattern is normal variantt. There is a  single common ostium of the innominate and left common carotid  arteries.   5.  The suprarenal abdominal aorta is moderately calcified; infrarenal  abdominal  aorta is severely calcified  6.  Widely patent origins of celiac trunk, superior mesenteric artery  and inferior mesenteric artery.  Single bilateral renal arteries with  widely patent origins.   7.  There is no acute aortic pathology, such as dissection, intramural  hematoma, or contained rupture.      MEASUREMENTS:   Representative dimensions of the thoracoabdominal aorta are as  follows:     1. AORTIC ANNULUS MEASUREMENTS:     1.  The average aortic annulus diameter is 22.3 mm, (long diameter is  26.3 mm and short diameter is 19.7 mm)  2.  Aortic annulus area is 3.92 cm2  3.  Aortic annulus perimeter is 72.1 mm  4.  Suggested 3-cusp coaxial angle for aortic valve is (LAO12 , CAU 7)  and alternate A-P coaxial angle is (LAO0 , CAU19 ), these  angles will  vary depending upon the patient's body position  5.  Aortic root angle is 44.8 degrees  6.  Left main - Annulus distance: 11.0 mm, RCA - Annulus distance:  14.4 mm     2. LVOT MEASUREMENTS:     1.  The average LVOT diameter (measured 4 mm below annular plane) is  21.8 mm  2.  LVOT area is 3.72 cm2  3.  LVOT perimeter is 70.7 mm     3. AORTA MEASUREMENTS     1.  The aortic root at the sinuses of Valsalva (left cusp, right cusp,  non cusp): 27.7 mm x  26  2.  3 mm x 28.1 mm  3.  The sinotubular junction:  23.7 mm x 22.7 mm  4.  Sinotubular junction height: 20.1 mm x 17.9 mm  5.  Proximal ascending aorta: 29.9 mm x 28.3 mm  6.  Distal abdominal aorta proximal to the bifurcation: 13.4 mm x 11.5  mm  7.  Innominate artery 3 cm from ostium: 11.4 mm x 10.8 mm  8.  Left common carotid artery 3 cm from ostium: 6.8 mm x 6.1 mm     4. ILIOFEMORAL MEASUREMENTS:     RIGHT:  1.  Right common iliac artery: 9.3 mm x 7.8 mm   2.  Right external iliac artery: 7.4 mm x 7.0 mm   3.  Right common femoral artery: 7.9 mm x 6.6 mm   4.  Tortuosity: mild   5.  Calcification: mild      LEFT:  1.  Left common iliac artery: 10.0 mm x  9.7 mm  2.  Left external iliac artery: 7.1 mm x 6.5  "mm  3.  Left common femoral artery: 7.2 mm x 7.1 mm  4.  Tortuosity: mild   5.  Calcification: mild      5. SUBCLAVIAN MEASUREMENTS:     RIGHT:  1. Minimal luminal diameter: 6.6 mm x  5.2 mm  2. Tortuosity: moderate   3. Calcification: mild      LEFT:  1. Minimal luminal diameter: 6.7 mm x  5.4 mm  2. Tortuosity: mild   3. Calcification: mild      OTHER FINDINGS:   1.  Please review radiology review for incidental non-cardiovascular  findings including lungs, abdominal organs, bone, soft tissue, nodes  to follow separately     I have personally reviewed the examination and initial interpretation  and I agree with the findings.     DARRIUS CARDOSO MD          Laboratory Studies:  Personally reviewed and interpreted by me.     CBC RESULTS:        Lab Results   Component Value Date     WBC 5.5 10/09/2023     RBC 4.43 10/09/2023     HGB 12.8 10/09/2023     HGB 13.6 10/09/2023     HCT 41.0 10/09/2023     MCV 93 10/09/2023     MCH 30.7 10/09/2023     MCHC 33.2 10/09/2023     RDW 12.6 10/09/2023      (L) 10/09/2023         BMP RESULTS:        Lab Results   Component Value Date      10/09/2023     POTASSIUM 5.0 10/09/2023     CHLORIDE 97 (L) 10/09/2023     CO2 27 10/09/2023     ANIONGAP 13 10/09/2023      (H) 10/09/2023     BUN 27.5 (H) 10/09/2023     CR 0.70 10/09/2023     GFRESTIMATED 87 10/09/2023     BUSHRA 9.9 10/09/2023         A1C RESULTS:  No results found for: \"A1C\"     INR RESULTS:        Lab Results   Component Value Date     INR 1.11 10/09/2023         Assessment and Plan   Lacey Casarez is a 78 year old female with severe symptomatic aortic stenosis. Given her older age, she is at moderate risk for adverse outcomes after surgical aortic valve replacement and should be considered for TAVR. She understands the risks and benefits of TAVR and is willing to proceed. She wishes to be considered for TAVR bailout and understands the risks.  "

## 2023-10-25 ENCOUNTER — HOSPITAL ENCOUNTER (OUTPATIENT)
Dept: CARDIOLOGY | Facility: CLINIC | Age: 78
Discharge: HOME | DRG: 266 | End: 2023-10-25
Attending: NURSE PRACTITIONER | Admitting: INTERNAL MEDICINE
Payer: COMMERCIAL

## 2023-10-25 ENCOUNTER — ANESTHESIA (OUTPATIENT)
Dept: SURGERY | Facility: CLINIC | Age: 78
DRG: 266 | End: 2023-10-25
Payer: COMMERCIAL

## 2023-10-25 ENCOUNTER — HOSPITAL ENCOUNTER (INPATIENT)
Facility: CLINIC | Age: 78
LOS: 2 days | Discharge: HOME | DRG: 266 | End: 2023-10-27
Attending: INTERNAL MEDICINE | Admitting: STUDENT IN AN ORGANIZED HEALTH CARE EDUCATION/TRAINING PROGRAM
Payer: COMMERCIAL

## 2023-10-25 DIAGNOSIS — I35.0 SEVERE AORTIC STENOSIS: ICD-10-CM

## 2023-10-25 DIAGNOSIS — I35.0 AORTIC STENOSIS, SEVERE: ICD-10-CM

## 2023-10-25 DIAGNOSIS — Z95.2 S/P TAVR (TRANSCATHETER AORTIC VALVE REPLACEMENT): Primary | ICD-10-CM

## 2023-10-25 DIAGNOSIS — I47.10 SVT (SUPRAVENTRICULAR TACHYCARDIA) (H): ICD-10-CM

## 2023-10-25 LAB
ACT BLD: 114 SECONDS (ref 74–150)
ACT BLD: 272 SECONDS (ref 74–150)
BLD PROD TYP BPU: NORMAL
BLD PROD TYP BPU: NORMAL
BLOOD COMPONENT TYPE: NORMAL
BLOOD COMPONENT TYPE: NORMAL
CODING SYSTEM: NORMAL
CODING SYSTEM: NORMAL
CROSSMATCH: NORMAL
CROSSMATCH: NORMAL
ISSUE DATE AND TIME: NORMAL
ISSUE DATE AND TIME: NORMAL
UNIT ABO/RH: NORMAL
UNIT ABO/RH: NORMAL
UNIT NUMBER: NORMAL
UNIT NUMBER: NORMAL
UNIT STATUS: NORMAL
UNIT STATUS: NORMAL
UNIT TYPE ISBT: 5100
UNIT TYPE ISBT: 5100

## 2023-10-25 PROCEDURE — C1889 IMPLANT/INSERT DEVICE, NOC: HCPCS | Performed by: INTERNAL MEDICINE

## 2023-10-25 PROCEDURE — 272N000085 HC PACK CELL SAVER CSP: Performed by: INTERNAL MEDICINE

## 2023-10-25 PROCEDURE — 272N000088 HC PUMP APP ADULT PERFUSION: Performed by: INTERNAL MEDICINE

## 2023-10-25 PROCEDURE — 4A023N7 MEASUREMENT OF CARDIAC SAMPLING AND PRESSURE, LEFT HEART, PERCUTANEOUS APPROACH: ICD-10-PCS | Performed by: INTERNAL MEDICINE

## 2023-10-25 PROCEDURE — 02RF38Z REPLACEMENT OF AORTIC VALVE WITH ZOOPLASTIC TISSUE, PERCUTANEOUS APPROACH: ICD-10-PCS | Performed by: INTERNAL MEDICINE

## 2023-10-25 PROCEDURE — C1760 CLOSURE DEV, VASC: HCPCS | Performed by: INTERNAL MEDICINE

## 2023-10-25 PROCEDURE — 250N000011 HC RX IP 250 OP 636: Mod: JZ

## 2023-10-25 PROCEDURE — 250N000013 HC RX MED GY IP 250 OP 250 PS 637: Performed by: STUDENT IN AN ORGANIZED HEALTH CARE EDUCATION/TRAINING PROGRAM

## 2023-10-25 PROCEDURE — 250N000011 HC RX IP 250 OP 636: Mod: JZ | Performed by: STUDENT IN AN ORGANIZED HEALTH CARE EDUCATION/TRAINING PROGRAM

## 2023-10-25 PROCEDURE — 85347 COAGULATION TIME ACTIVATED: CPT

## 2023-10-25 PROCEDURE — 99223 1ST HOSP IP/OBS HIGH 75: CPT | Mod: 25 | Performed by: NURSE PRACTITIONER

## 2023-10-25 PROCEDURE — 93005 ELECTROCARDIOGRAM TRACING: CPT

## 2023-10-25 PROCEDURE — 258N000003 HC RX IP 258 OP 636: Performed by: STUDENT IN AN ORGANIZED HEALTH CARE EDUCATION/TRAINING PROGRAM

## 2023-10-25 PROCEDURE — 250N000011 HC RX IP 250 OP 636: Performed by: NURSE PRACTITIONER

## 2023-10-25 PROCEDURE — 33361 REPLACE AORTIC VALVE PERQ: CPT | Mod: 62 | Performed by: STUDENT IN AN ORGANIZED HEALTH CARE EDUCATION/TRAINING PROGRAM

## 2023-10-25 PROCEDURE — 93010 ELECTROCARDIOGRAM REPORT: CPT | Performed by: INTERNAL MEDICINE

## 2023-10-25 PROCEDURE — 272N000001 HC OR GENERAL SUPPLY STERILE: Performed by: INTERNAL MEDICINE

## 2023-10-25 PROCEDURE — 93308 TTE F-UP OR LMTD: CPT | Mod: 26 | Performed by: STUDENT IN AN ORGANIZED HEALTH CARE EDUCATION/TRAINING PROGRAM

## 2023-10-25 PROCEDURE — 370N000017 HC ANESTHESIA TECHNICAL FEE, PER MIN: Performed by: INTERNAL MEDICINE

## 2023-10-25 PROCEDURE — 93321 DOPPLER ECHO F-UP/LMTD STD: CPT | Mod: 26 | Performed by: STUDENT IN AN ORGANIZED HEALTH CARE EDUCATION/TRAINING PROGRAM

## 2023-10-25 PROCEDURE — C1769 GUIDE WIRE: HCPCS | Performed by: INTERNAL MEDICINE

## 2023-10-25 PROCEDURE — 93325 DOPPLER ECHO COLOR FLOW MAPG: CPT

## 2023-10-25 PROCEDURE — P9016 RBC LEUKOCYTES REDUCED: HCPCS

## 2023-10-25 PROCEDURE — 250N000013 HC RX MED GY IP 250 OP 250 PS 637: Performed by: NURSE PRACTITIONER

## 2023-10-25 PROCEDURE — 250N000013 HC RX MED GY IP 250 OP 250 PS 637

## 2023-10-25 PROCEDURE — 120N000003 HC R&B IMCU UMMC

## 2023-10-25 PROCEDURE — 410N000003 HC PER-PERFUSION 1ST 30 MIN: Performed by: INTERNAL MEDICINE

## 2023-10-25 PROCEDURE — 250N000009 HC RX 250: Performed by: STUDENT IN AN ORGANIZED HEALTH CARE EDUCATION/TRAINING PROGRAM

## 2023-10-25 PROCEDURE — 250N000011 HC RX IP 250 OP 636: Mod: JZ | Performed by: INTERNAL MEDICINE

## 2023-10-25 PROCEDURE — 86923 COMPATIBILITY TEST ELECTRIC: CPT

## 2023-10-25 PROCEDURE — 250N000009 HC RX 250: Performed by: INTERNAL MEDICINE

## 2023-10-25 PROCEDURE — 93325 DOPPLER ECHO COLOR FLOW MAPG: CPT | Mod: 26 | Performed by: STUDENT IN AN ORGANIZED HEALTH CARE EDUCATION/TRAINING PROGRAM

## 2023-10-25 PROCEDURE — 93308 TTE F-UP OR LMTD: CPT

## 2023-10-25 PROCEDURE — 272N000002 HC OR SUPPLY OTHER OPNP: Performed by: INTERNAL MEDICINE

## 2023-10-25 PROCEDURE — C1894 INTRO/SHEATH, NON-LASER: HCPCS | Performed by: INTERNAL MEDICINE

## 2023-10-25 PROCEDURE — 33361 REPLACE AORTIC VALVE PERQ: CPT | Performed by: INTERNAL MEDICINE

## 2023-10-25 DEVICE — IMPLANTABLE DEVICE: Type: IMPLANTABLE DEVICE | Status: FUNCTIONAL

## 2023-10-25 DEVICE — VALVE AORTIC TRANSCATHETER HEART 23MM SAPIEN 3 ULTRA RESILIA: Type: IMPLANTABLE DEVICE | Site: CHEST | Status: FUNCTIONAL

## 2023-10-25 RX ORDER — SODIUM CHLORIDE 9 MG/ML
INJECTION, SOLUTION INTRAVENOUS CONTINUOUS
Status: DISCONTINUED | OUTPATIENT
Start: 2023-10-25 | End: 2023-10-25 | Stop reason: HOSPADM

## 2023-10-25 RX ORDER — ONDANSETRON 4 MG/1
4 TABLET, FILM COATED ORAL EVERY 6 HOURS PRN
Status: DISCONTINUED | OUTPATIENT
Start: 2023-10-25 | End: 2023-10-25

## 2023-10-25 RX ORDER — FENTANYL CITRATE 50 UG/ML
25 INJECTION, SOLUTION INTRAMUSCULAR; INTRAVENOUS EVERY 5 MIN PRN
Status: DISCONTINUED | OUTPATIENT
Start: 2023-10-25 | End: 2023-10-25 | Stop reason: HOSPADM

## 2023-10-25 RX ORDER — ONDANSETRON 4 MG/1
4 TABLET, ORALLY DISINTEGRATING ORAL EVERY 30 MIN PRN
Status: DISCONTINUED | OUTPATIENT
Start: 2023-10-25 | End: 2023-10-25 | Stop reason: HOSPADM

## 2023-10-25 RX ORDER — ATORVASTATIN CALCIUM 20 MG/1
20 TABLET, FILM COATED ORAL DAILY
Status: DISCONTINUED | OUTPATIENT
Start: 2023-10-26 | End: 2023-10-27 | Stop reason: HOSPADM

## 2023-10-25 RX ORDER — HYDRALAZINE HYDROCHLORIDE 25 MG/1
25 TABLET, FILM COATED ORAL EVERY 6 HOURS
Status: DISCONTINUED | OUTPATIENT
Start: 2023-10-25 | End: 2023-10-26

## 2023-10-25 RX ORDER — NITROGLYCERIN 0.4 MG/1
0.4 TABLET SUBLINGUAL EVERY 5 MIN PRN
Status: DISCONTINUED | OUTPATIENT
Start: 2023-10-25 | End: 2023-10-27 | Stop reason: HOSPADM

## 2023-10-25 RX ORDER — ACETAMINOPHEN 325 MG/1
650 TABLET ORAL EVERY 4 HOURS PRN
Status: DISCONTINUED | OUTPATIENT
Start: 2023-10-25 | End: 2023-10-27 | Stop reason: HOSPADM

## 2023-10-25 RX ORDER — SODIUM CHLORIDE, SODIUM LACTATE, POTASSIUM CHLORIDE, CALCIUM CHLORIDE 600; 310; 30; 20 MG/100ML; MG/100ML; MG/100ML; MG/100ML
INJECTION, SOLUTION INTRAVENOUS CONTINUOUS
Status: DISCONTINUED | OUTPATIENT
Start: 2023-10-25 | End: 2023-10-25 | Stop reason: HOSPADM

## 2023-10-25 RX ORDER — KETAMINE HYDROCHLORIDE 10 MG/ML
INJECTION INTRAMUSCULAR; INTRAVENOUS PRN
Status: DISCONTINUED | OUTPATIENT
Start: 2023-10-25 | End: 2023-10-25

## 2023-10-25 RX ORDER — HYDROMORPHONE HYDROCHLORIDE 1 MG/ML
0.2 INJECTION, SOLUTION INTRAMUSCULAR; INTRAVENOUS; SUBCUTANEOUS EVERY 5 MIN PRN
Status: DISCONTINUED | OUTPATIENT
Start: 2023-10-25 | End: 2023-10-25 | Stop reason: HOSPADM

## 2023-10-25 RX ORDER — IOPAMIDOL 755 MG/ML
INJECTION, SOLUTION INTRAVASCULAR
Status: DISCONTINUED | OUTPATIENT
Start: 2023-10-25 | End: 2023-10-25 | Stop reason: HOSPADM

## 2023-10-25 RX ORDER — NITROGLYCERIN 10 MG/100ML
INJECTION INTRAVENOUS PRN
Status: DISCONTINUED | OUTPATIENT
Start: 2023-10-25 | End: 2023-10-25

## 2023-10-25 RX ORDER — OXYCODONE HYDROCHLORIDE 10 MG/1
10 TABLET ORAL
Status: CANCELLED | OUTPATIENT
Start: 2023-10-25

## 2023-10-25 RX ORDER — OXYCODONE HYDROCHLORIDE 5 MG/1
5 TABLET ORAL
Status: CANCELLED | OUTPATIENT
Start: 2023-10-25

## 2023-10-25 RX ORDER — LIDOCAINE 40 MG/G
CREAM TOPICAL
Status: DISCONTINUED | OUTPATIENT
Start: 2023-10-25 | End: 2023-10-25 | Stop reason: HOSPADM

## 2023-10-25 RX ORDER — FENTANYL CITRATE 50 UG/ML
50 INJECTION, SOLUTION INTRAMUSCULAR; INTRAVENOUS EVERY 5 MIN PRN
Status: DISCONTINUED | OUTPATIENT
Start: 2023-10-25 | End: 2023-10-25 | Stop reason: HOSPADM

## 2023-10-25 RX ORDER — HYDROMORPHONE HYDROCHLORIDE 1 MG/ML
0.4 INJECTION, SOLUTION INTRAMUSCULAR; INTRAVENOUS; SUBCUTANEOUS EVERY 5 MIN PRN
Status: DISCONTINUED | OUTPATIENT
Start: 2023-10-25 | End: 2023-10-25 | Stop reason: HOSPADM

## 2023-10-25 RX ORDER — CEFAZOLIN SODIUM/WATER 2 G/20 ML
2 SYRINGE (ML) INTRAVENOUS
Status: COMPLETED | OUTPATIENT
Start: 2023-10-25 | End: 2023-10-25

## 2023-10-25 RX ORDER — PROPOFOL 10 MG/ML
INJECTION, EMULSION INTRAVENOUS CONTINUOUS PRN
Status: DISCONTINUED | OUTPATIENT
Start: 2023-10-25 | End: 2023-10-25

## 2023-10-25 RX ORDER — HYDROCHLOROTHIAZIDE 25 MG/1
25 TABLET ORAL DAILY
Status: DISCONTINUED | OUTPATIENT
Start: 2023-10-25 | End: 2023-10-26

## 2023-10-25 RX ORDER — ONDANSETRON 4 MG/1
4 TABLET, FILM COATED ORAL EVERY 6 HOURS PRN
Status: DISCONTINUED | OUTPATIENT
Start: 2023-10-25 | End: 2023-10-27 | Stop reason: HOSPADM

## 2023-10-25 RX ORDER — FENTANYL CITRATE 0.05 MG/ML
INJECTION, SOLUTION INTRAMUSCULAR; INTRAVENOUS PRN
Status: DISCONTINUED | OUTPATIENT
Start: 2023-10-25 | End: 2023-10-25

## 2023-10-25 RX ORDER — ONDANSETRON 2 MG/ML
4 INJECTION INTRAMUSCULAR; INTRAVENOUS EVERY 30 MIN PRN
Status: DISCONTINUED | OUTPATIENT
Start: 2023-10-25 | End: 2023-10-25 | Stop reason: HOSPADM

## 2023-10-25 RX ORDER — ONDANSETRON 2 MG/ML
4 INJECTION INTRAMUSCULAR; INTRAVENOUS EVERY 6 HOURS PRN
Status: DISCONTINUED | OUTPATIENT
Start: 2023-10-25 | End: 2023-10-27 | Stop reason: HOSPADM

## 2023-10-25 RX ORDER — ASPIRIN 81 MG/1
81 TABLET ORAL DAILY
Status: DISCONTINUED | OUTPATIENT
Start: 2023-10-26 | End: 2023-10-27 | Stop reason: HOSPADM

## 2023-10-25 RX ORDER — ONDANSETRON 4 MG/1
4 TABLET, ORALLY DISINTEGRATING ORAL EVERY 30 MIN PRN
Status: CANCELLED | OUTPATIENT
Start: 2023-10-25

## 2023-10-25 RX ORDER — LISINOPRIL 20 MG/1
40 TABLET ORAL DAILY
Status: DISCONTINUED | OUTPATIENT
Start: 2023-10-25 | End: 2023-10-27 | Stop reason: HOSPADM

## 2023-10-25 RX ORDER — HYDRALAZINE HYDROCHLORIDE 20 MG/ML
10 INJECTION INTRAMUSCULAR; INTRAVENOUS
Status: DISCONTINUED | OUTPATIENT
Start: 2023-10-25 | End: 2023-10-27 | Stop reason: HOSPADM

## 2023-10-25 RX ORDER — ISOSORBIDE DINITRATE 10 MG/1
10 TABLET ORAL ONCE
Status: COMPLETED | OUTPATIENT
Start: 2023-10-25 | End: 2023-10-25

## 2023-10-25 RX ORDER — ASPIRIN 325 MG
325 TABLET ORAL DAILY
Status: DISCONTINUED | OUTPATIENT
Start: 2023-10-25 | End: 2023-10-25 | Stop reason: HOSPADM

## 2023-10-25 RX ORDER — PROTAMINE SULFATE 10 MG/ML
INJECTION, SOLUTION INTRAVENOUS PRN
Status: DISCONTINUED | OUTPATIENT
Start: 2023-10-25 | End: 2023-10-25

## 2023-10-25 RX ORDER — HEPARIN SODIUM 1000 [USP'U]/ML
INJECTION, SOLUTION INTRAVENOUS; SUBCUTANEOUS PRN
Status: DISCONTINUED | OUTPATIENT
Start: 2023-10-25 | End: 2023-10-25

## 2023-10-25 RX ORDER — ONDANSETRON 2 MG/ML
4 INJECTION INTRAMUSCULAR; INTRAVENOUS EVERY 30 MIN PRN
Status: CANCELLED | OUTPATIENT
Start: 2023-10-25

## 2023-10-25 RX ORDER — ACETAMINOPHEN 325 MG/1
975 TABLET ORAL ONCE
Status: COMPLETED | OUTPATIENT
Start: 2023-10-25 | End: 2023-10-25

## 2023-10-25 RX ADMIN — HYDROCHLOROTHIAZIDE 25 MG: 25 TABLET ORAL at 16:31

## 2023-10-25 RX ADMIN — DEXMEDETOMIDINE HYDROCHLORIDE 0.5 MCG/KG/HR: 100 INJECTION, SOLUTION INTRAVENOUS at 10:17

## 2023-10-25 RX ADMIN — PHENYLEPHRINE HYDROCHLORIDE 200 MCG: 10 INJECTION INTRAVENOUS at 11:19

## 2023-10-25 RX ADMIN — Medication 20 MG: at 10:32

## 2023-10-25 RX ADMIN — PHENYLEPHRINE HYDROCHLORIDE 100 MCG: 10 INJECTION INTRAVENOUS at 11:55

## 2023-10-25 RX ADMIN — HYDRALAZINE HYDROCHLORIDE 10 MG: 20 INJECTION INTRAMUSCULAR; INTRAVENOUS at 19:44

## 2023-10-25 RX ADMIN — PHENYLEPHRINE HYDROCHLORIDE 200 MCG: 10 INJECTION INTRAVENOUS at 10:45

## 2023-10-25 RX ADMIN — Medication 10 MG: at 11:06

## 2023-10-25 RX ADMIN — NITROGLYCERIN 100 MCG: 10 INJECTION INTRAVENOUS at 11:52

## 2023-10-25 RX ADMIN — PROPOFOL 50 MCG/KG/MIN: 10 INJECTION, EMULSION INTRAVENOUS at 10:18

## 2023-10-25 RX ADMIN — PHENYLEPHRINE HYDROCHLORIDE 100 MCG: 10 INJECTION INTRAVENOUS at 12:25

## 2023-10-25 RX ADMIN — NITROGLYCERIN 50 MCG: 10 INJECTION INTRAVENOUS at 11:33

## 2023-10-25 RX ADMIN — HYDRALAZINE HYDROCHLORIDE 25 MG: 25 TABLET, FILM COATED ORAL at 22:06

## 2023-10-25 RX ADMIN — SODIUM CHLORIDE, POTASSIUM CHLORIDE, SODIUM LACTATE AND CALCIUM CHLORIDE: 600; 310; 30; 20 INJECTION, SOLUTION INTRAVENOUS at 10:15

## 2023-10-25 RX ADMIN — PHENYLEPHRINE HYDROCHLORIDE 0.5 MCG/KG/MIN: 10 INJECTION INTRAVENOUS at 10:48

## 2023-10-25 RX ADMIN — FENTANYL CITRATE 50 MCG: 0.05 INJECTION, SOLUTION INTRAMUSCULAR; INTRAVENOUS at 11:19

## 2023-10-25 RX ADMIN — HYDRALAZINE HYDROCHLORIDE 10 MG: 20 INJECTION INTRAMUSCULAR; INTRAVENOUS at 16:45

## 2023-10-25 RX ADMIN — LISINOPRIL 40 MG: 20 TABLET ORAL at 16:31

## 2023-10-25 RX ADMIN — ACETAMINOPHEN 975 MG: 325 TABLET, FILM COATED ORAL at 09:09

## 2023-10-25 RX ADMIN — Medication 2 G: at 10:30

## 2023-10-25 RX ADMIN — ONDANSETRON 4 MG: 2 INJECTION INTRAMUSCULAR; INTRAVENOUS at 20:55

## 2023-10-25 RX ADMIN — HEPARIN SODIUM 11000 UNITS: 1000 INJECTION INTRAVENOUS; SUBCUTANEOUS at 11:22

## 2023-10-25 RX ADMIN — ISOSORBIDE DINITRATE 10 MG: 10 TABLET ORAL at 23:35

## 2023-10-25 RX ADMIN — PROTAMINE SULFATE 100 MG: 10 INJECTION, SOLUTION INTRAVENOUS at 11:49

## 2023-10-25 RX ADMIN — NITROGLYCERIN 100 MCG: 10 INJECTION INTRAVENOUS at 11:43

## 2023-10-25 ASSESSMENT — ACTIVITIES OF DAILY LIVING (ADL)
ADLS_ACUITY_SCORE: 24
ADLS_ACUITY_SCORE: 24
ADLS_ACUITY_SCORE: 20
ADLS_ACUITY_SCORE: 24
ADLS_ACUITY_SCORE: 20
ADLS_ACUITY_SCORE: 26
ADLS_ACUITY_SCORE: 26
ADLS_ACUITY_SCORE: 20

## 2023-10-25 NOTE — OP NOTE
Cardiac Surgery - Operative Report    DATE OF SERVICE: 10/25/2023     PREOPERATIVE DIAGNOSES: Severe aortic stenosis.     POSTOPERATIVE DIAGNOSES: Severe aortic stenosis.     PROCEDURE PERFORMED: Transfemoral transcatheter aortic valve replacement      ATTENDING CARDIOLOGISTS:  Dr. Heredia    SURGEON: Michael Mulvihill, MD     ANESTHESIA:  Managed anesthesia care.     ESTIMATED BLOOD LOSS:  Minimal.     SPECIMEN:  None    Implant Name Type Inv. Item Serial No.  Lot No. LRB No. Used Action   STREAMLINE MODEL [id number] BIPOLAR TEMPORARY MYOCARDIAL PACING LEAD Leads   [employer]   1 Implanted   VALVE AORTIC TRANSCATHETER HEART 23MM ESTRELLITA 3 ULTRA RESILIA - [id number] Valve VALVE AORTIC TRANSCATHETER HEART 23MM ESTRELLITA 3 ULTRA RESILIA [id number] [employer]   1 Implanted        INDICATIONS FOR PROCEDURE: 78F with severe aortic stenosis. She was evaluated by the multidisciplinary team. Transcatheter replacement was recommended. The patient understands the risks and benefits of the procedure and wishes to undergo the operation.     OPERATIVE FINDINGS:  There was trace perivalvular leak after the procedure. Valve mean gradient was normal.    APPROACH: Right and left common femoral arteries, left common femoral vein    SHEATH: 14 Fr [place] eSheath in the RCFA, 7Fr long sheath in the LCFA, 6 Fr long sheath in the LCFV    CATHETERS: 6Fr angled pigtail, 6Fr AL-1     OPERATIVE DESCRIPTION IN DETAIL:  After obtaining informed consent, the patient was brought to the operating room and placed in the supine position on the operating room table.  Appropriate lines and devices for monitoring were placed by the anesthesia team.  The was sedated and prepped and draped before a timeout was performed to confirm the correct patient identity, as well as the procedure to be performed.       The valve was successfully deployed with balloon expansion using final balloon volume of nominal and at a depth of 80/20 position.      TTE: trace catheter-associated valvular insufficiency    CLOSURE: the valve sheath access arteriotomy was successfully closed with the MANTA / perclose device. Hemostasis was excellent following manual compression    Michael Mulvihill, MD  10/25/2023 @ 1:36 PM

## 2023-10-25 NOTE — PROGRESS NOTES
Transferred to: Unit 6C from PACU at 1340  Belongings: Brought up from PACU. Personal belongings at bedside.  Marquez removed? NA  Central line removed? NA  Chart and medications sent with patient Yes  Family notified: Yes

## 2023-10-25 NOTE — ANESTHESIA CARE TRANSFER NOTE
Patient: Lacey Casarez    Procedure: Procedure(s):  Transcatheter aortic valve replacement with any indicated procedure- Ismael.  OR TRANSCATHETER AORTIC VALVE REPLACEMENT, OPEN FEMORAL ARTERY APPROACH- Ismael       Diagnosis: Severe aortic stenosis [I35.0]  Diagnosis Additional Information: No value filed.    Anesthesia Type:   MAC     Note:    Oropharynx: oropharynx clear of all foreign objects and spontaneously breathing  Level of Consciousness: drowsy  Oxygen Supplementation: face mask  Level of Supplemental Oxygen (L/min / FiO2): 8  Independent Airway: airway patency satisfactory and stable  Dentition: dentition unchanged  Vital Signs Stable: post-procedure vital signs reviewed and stable    Patient transferred to: PACU  Comments: Sleepy leaving OR and in PACu, VSS. Phenylephrine and fluids at sign out for SBP 85  Handoff Report: Identifed the Patient, Identified the Reponsible Provider, Reviewed the pertinent medical history, Discussed the surgical course, Reviewed Intra-OP anesthesia mangement and issues during anesthesia, Set expectations for post-procedure period and Allowed opportunity for questions and acknowledgement of understanding      Vitals:  Vitals Value Taken Time   /62 10/25/23 1229   Temp     Pulse 71 10/25/23 1234   Resp     SpO2 97 % 10/25/23 1234   Vitals shown include unfiled device data.    Electronically Signed By: HONEY Brown CRNA  October 25, 2023  12:35 PM

## 2023-10-25 NOTE — DISCHARGE SUMMARY
[address] [address]  p: [phone]    Discharge Summary: Cardiology Service    Lacey Casarez MRN# [id number]   YOB: 1945 Age: 78 year old       Admission Date: 10/25/2023  Discharge Date: 10/26/2023    Discharge Diagnoses:  # Severe aortic stenosis  # s/p TAVR  # Acute on chronic heart failure with preserved ejection fraction  # HTN  # SVT  # Pre-diabetes  # Elevated BMI    Brief HPI:  Lacey Casarez is a 78 year old female with a history of essential hypertension, SVT, prediabetes, elevated BMI and severe aortic stenosis who is admitted for planned TAVR procedure with Dr. Heredia.    Hospital Course by Diagnosis:  # Severe aortic stenosis  # s/p TAVR  # Acute on chronic heart failure with preserved ejection fraction  Prior to procedure, pt noted to have a mean gradient 50 mmHG, PAUL 0.88 cm^2, PV 4.7 m/s. Now s/p TAVR with 23 mm Edward Terell valve. MG 4 mmHg, trace AI.  Pre procedure LVEDP 29 mmHg. Procedure was uncomplicated.      - Groin sites CDI no hematoma, swelling/bleeding   - EKG POD 1: no new BBB/conduction issues   - Echocardiogram POD 1: EF > 65%, valve well seated, PV 2.5 m/s, MG 12 mmHg, no valvular or paravalvular AI  - Aspirin 81 mg for anti-platelet therapy.   - Follow up with Dee MÉNDEZ 10/30/23    # unresponsive episode  Unresponsive episode POD 1 while RN in room. BP and HR stable during event, patient had just been repositioned so possibly orthostasis. Neurologically intact, fully recovered in less than 5 minutes  - Discussed with Dr. Heredia, recommend discharge on Tiinkk[employer] monitor    Chronic Issues:  HTN: continue PTA Metoprolol, continue PTA Lisinopril 40 mg daily and hydrochlorothiazide 25 mg daily  SVT: resuming due to intermittent SVT post TAVR per Dr. Heredia's recommendations.  Pre-diabetes: most recent A1C 5.6%  Elevated BMI: BMI 29, encouraged healthy lifestyle   modifications    Pertinent Procedures:  TAVR 10/25/23    Consults:  RNCC/SW  PT/OT    Medication Changes:  HOLD PTA Toprol  mg daily     Discharge medications:   Current Discharge Medication List        CONTINUE these medications which have NOT CHANGED    Details   acetaminophen (TYLENOL) 500 MG tablet Maximum acetaminophen dose is 4000 mg in 24 hours      albuterol (PROAIR HFA/PROVENTIL HFA/VENTOLIN HFA) 108 (90 Base) MCG/ACT inhaler Inhale 1-2 Puffs every 6 hours as needed for Wheezing.      aspirin (ASA) 325 MG EC tablet Take 325 mg by mouth daily      atorvastatin (LIPITOR) 20 MG tablet Take 20 mg by mouth daily      fluticasone (FLOVENT HFA) 44 MCG/ACT inhaler Inhale 2 puffs into the lungs      hydrochlorothiazide (HYDRODIURIL) 25 MG tablet Take 25 mg by mouth daily      lisinopril (ZESTRIL) 40 MG tablet Take 40 mg by mouth daily      metoprolol succinate ER (TOPROL XL) 100 MG 24 hr tablet Take 100 mg by mouth daily      blood glucose (ONETOUCH VERIO IQ) test strip TEST BLOOD SUGAR ONCE WEEKLY.             Follow-up:  Dee MÉNDEZ CNP 10/30/23    Labs or imaging requiring follow-up after discharge:  BMP, EKG    Code status:  FULL    Condition on discharge  Temp:  [97.5  F (36.4  C)-98.2  F (36.8  C)] 97.5  F (36.4  C)  Pulse:  [58-73] 58  Resp:  [10-20] 14  BP: ()/() 118/53  SpO2:  [95 %-100 %] 98 %  General: Alert, interactive, no acute distress  HEENT: Normocephalic, atraumatic. Sclera anicteric.   Neck: JVP not elevated  Cardiovascular: Regular rate and rhythm, normal S1 and S2, no murmurs, gallops, or rubs. Radial and pedal pulses palpable bilaterally.   Resp: Regular work of breathing on room air. Clear to auscultation bilaterally, no rales, wheezes, or rhonchi  GI: Soft, nontender, nondistended.   Extremities: no edema, no cyanosis or clubbing, warm and well perfused. Groin sites CDI  Skin: Warm and dry, no jaundice or rash  Neuro: Alert and oriented x 3. CN 2-12 intact, moves all  extremities equally, normal speech  Psych: Mood and affect are appropriate    Imaging with results:  EKG 10/26/23 POD 1:      Echocardiogram 10/26/23 POD 1:  Interpretation Summary  TAVR with 23mm Guevara Resilia S3 on 10/25/23.  Valve is well seated with normal doppler assessment. PV 2.5m/s, MG 12mmHg; no  valvular or paravalvular AI.  Left ventricular function is normal.The ejection fraction is > 65%.  Global RV function appears normal on limited views.  The inferior vena cava is normal.  No pericardial effusion.  ___________________    TAVR 10/25/23:  Structural Valves    TAVR    PROCEDURE REPORT:    TAVR PROCEDURE REPORT:     PROCEDURES PERFORMED:   1. Iliofemoral artery angiography.  2. Ascending aorta angiography.  3. Left heart catheterization.  4. Successful transfemoral transcatheter aortic valve replacement with a 23mm Guevara Terell 3 valve.  5. Arteriotomy closure.    PHYSICIANS:  1. Attending Interventional Cardiology Staff: Dr Heredia  2. Attending Cardiovascular Surgery Staff: Dr Mulvihill  3. Structural Interventional Cardiology Fellow: Dr Vogel     INDICATION:  Lacey Casarez is a 78 year old female with severe symptomatic aortic stenosis who presents on an elective outpatient basis for transfemoral transcatheter aortic valve replacement with plan for an Guevara Terell 3 valve.    DESCRIPTION:  1. Consent obtained with discussion of risks.  All questions were answered.  2. Sterile prep and procedure per OR protocol.  3. Location: right and left common femoral arteries and left common femoral vein.  4. Access: Local anesthetic with lidocaine.  A standard (18 g) needle with ultrasound guidance was used to establish vascular access using a modified Seldinger technique.  5. Sheath:  14Fr Guevara eSheath in the RCFA, 7Fr long sheath in the LCFA,  4 long sheath in the RCFV  6. Catheters: 6Fr angled pigtail and 6Fr AL-1.  7. Estimated blood loss of 20 mL.  8. See below for procedue  details.    MEDICATIONS:  1. Sedation per anesthesia.  2. Heparin administered to achieve a goal ACT > 300 sec per anesthesia.   3. Protamine IV.    SUMMARY:  1. Access was obtained in the right and left common femoral arteries and the right common femoral vein by the interventional cardiology team.  The arterial site used for valve delivery was pre-closed with two Perclose Proglide devices.   2. Iliofemoral angiography was used to guide RCFA access for insertion of the valve sheath.  A Lunderquist wire was used to support the Guevara eSheath insertion.    3. The 6Fr pigtail catheter was positioned in the right-coronary cusp and angiography was used to confirm the optimal implant angle.  The pigtail was then moved to the non-coronary cusp.    4. Access into the LV was obtained using an AL-1 catheter and a straight wire.  The AL-1 catheter was used to exchange the straight wire for a J-wire and a pigtail catheter was then positioned in the left ventricle.  The LVEDP was measured with a pressure of 29 mmHg.  The pigtail catheter was used to advance a Savvy wire into in the LV and the pigtail was removed.    5. The 23mm Guevara Terell 3 prosthetic valve was then positioned across the native aortic valve in a 80/20 position.  Angiography and echocardiography were used to confirm optimal valve position.  Rapid pacing at 180 bpm was initiated and the valve was implanted with expansion of the balloon using a final balloon volume of nominal mL.  6. Subsequent transthoracic echocardiography and an ascending aortogram showed trace paravalvular insufficiency after Savvy wire was removed. Mean gradient was 4 mmHg by TTE.     8. The valve sheath access arteriotomy was successfully closed with the double suture closure devices.     9. A final iliofemoral angiogram showed no evidence of extravasation or stenosis.   10. The LCFA 7Fr arteriotomy was successfully closed with a 8Fr AngioSeal closure device.    NOTABLE EVENTS:  1.  None    COMPLICATIONS:  1. None    SUMMARY:    1. Acute on chronic diastolic heart failure  2. Lifestyle-limiting severe aortic stenosis.  3. Successful transfemoral transcatheter aortic valve replacement with a 23mm Guevara Resilia Terell 3 valve (nominal).  4. Left heart catheterization with LVEDP of 29 mmHg.  5. Right and left common femoral arteriotomies successfully closed with closure devices.    PLAN:    1. Aspirin 81 mg po daily lifelong.  2. Bedrest per protocol.  3. Admit to the primary inpatient team for further evaluation and management.  4. Echocardiogram tomorrow.   5. Lifelong antibiotic prophylaxis prior to all dental procedures.      The attending interventional cardiologists were present and participated in the entire procedure.     TMVR          Recent Labs   Lab 10/23/23  1546      POTASSIUM 3.8   CHLORIDE 100   CO2 28   ANIONGAP 11   *   BUN 19.3   CR 0.60   GFRESTIMATED >90   BUSHRA 9.6         Patient Care Team:  Tiff Sparrow MD as PCP - General (Family Medicine)    Time Spent on this Encounter   I, HONEY Cruz CNP, personally saw the patient today and spent greater than 30 minutes discharging this patient.    >30 minutes spent in discharge, including >50% in counseling and coordination of care, medication review and plan of care recommended on follow up. Questions were answered.   It was our pleasure to care for Lacey Casarez during this hospitalization. Please do not hesitate to contact me should there be questions regarding the hospital course or discharge plan.    Patient discussed with staff cardiologist, Dr. Heredia, who agrees with the above documentation and plan. Documentation represents joint decision making.     HONEY Fitch, CNP  [facility] Cardiology

## 2023-10-25 NOTE — ANESTHESIA POSTPROCEDURE EVALUATION
Patient: Lacey Casarez    Procedure: Procedure(s):  Transcatheter aortic valve replacement with any indicated procedure- Ismael.  OR TRANSCATHETER AORTIC VALVE REPLACEMENT, OPEN FEMORAL ARTERY APPROACH- Ismael       Anesthesia Type:  MAC    Note:  Disposition: Admission   Postop Pain Control: Uneventful            Sign Out: Well controlled pain   PONV: No   Neuro/Psych: Uneventful            Sign Out: Acceptable/Baseline neuro status   Airway/Respiratory: Uneventful            Sign Out: Acceptable/Baseline resp. status   CV/Hemodynamics: Uneventful            Sign Out: Acceptable CV status; No obvious hypovolemia; No obvious fluid overload   Other NRE: NONE   DID A NON-ROUTINE EVENT OCCUR? No    Event details/Postop Comments:  No complications.           Last vitals:  Vitals Value Taken Time   BP     Temp     Pulse     Resp     SpO2         Electronically Signed By: Adrian Dumont MD  October 25, 2023  12:21 PM

## 2023-10-25 NOTE — PHARMACY-ADMISSION MEDICATION HISTORY
Pharmacist Admission Medication History    Admission medication history is complete. The information provided in this note is only as accurate as the sources available at the time of the update.    Information Source(s): Patient, Clinic records, Hospital records, and CareEverywhere/[facility] via N/A    Pertinent Information: patient reported last doses to admitting RN    Changes made to PTA medication list:  Added: None  Deleted: None  Changed: None    Allergies reviewed with patient and updates made in EHR: no    Medication History Completed By: Candido Stewart RPH 10/25/2023 6:09 PM    PTA Med List   Medication Sig Last Dose    acetaminophen (TYLENOL) 500 MG tablet Take 500-1,000 mg by mouth every 6 hours as needed for mild pain or fever Past Month    albuterol (PROAIR HFA/PROVENTIL HFA/VENTOLIN HFA) 108 (90 Base) MCG/ACT inhaler Inhale 1-2 Puffs every 6 hours as needed for Wheezing. Past Week    aspirin (ASA) 325 MG tablet Take 325 mg by mouth daily 10/24/2023 at 0500    atorvastatin (LIPITOR) 20 MG tablet Take 20 mg by mouth daily 10/25/2023 at 0500    fluticasone (FLOVENT HFA) 44 MCG/ACT inhaler Inhale 2 puffs into the lungs 2 times daily Past Week    hydrochlorothiazide (HYDRODIURIL) 25 MG tablet Take 25 mg by mouth daily 10/24/2023 at 0830    lisinopril (ZESTRIL) 40 MG tablet Take 40 mg by mouth daily 10/24/2023 at 0830    metoprolol succinate ER (TOPROL XL) 100 MG 24 hr tablet Take 100 mg by mouth daily 10/24/2023 at 0830

## 2023-10-26 ENCOUNTER — APPOINTMENT (OUTPATIENT)
Dept: OCCUPATIONAL THERAPY | Facility: CLINIC | Age: 78
DRG: 266 | End: 2023-10-26
Attending: STUDENT IN AN ORGANIZED HEALTH CARE EDUCATION/TRAINING PROGRAM
Payer: COMMERCIAL

## 2023-10-26 ENCOUNTER — APPOINTMENT (OUTPATIENT)
Dept: CARDIOLOGY | Facility: CLINIC | Age: 78
DRG: 266 | End: 2023-10-26
Attending: STUDENT IN AN ORGANIZED HEALTH CARE EDUCATION/TRAINING PROGRAM
Payer: COMMERCIAL

## 2023-10-26 LAB
ANION GAP SERPL CALCULATED.3IONS-SCNC: 14 MMOL/L (ref 7–15)
BASOPHILS # BLD AUTO: 0 10E3/UL (ref 0–0.2)
BASOPHILS NFR BLD AUTO: 0 %
BUN SERPL-MCNC: 20.3 MG/DL (ref 8–23)
CALCIUM SERPL-MCNC: 9.5 MG/DL (ref 8.8–10.2)
CHLORIDE SERPL-SCNC: 102 MMOL/L (ref 98–107)
CREAT SERPL-MCNC: 0.67 MG/DL (ref 0.51–0.95)
DEPRECATED HCO3 PLAS-SCNC: 23 MMOL/L (ref 22–29)
EGFRCR SERPLBLD CKD-EPI 2021: 88 ML/MIN/1.73M2
EOSINOPHIL # BLD AUTO: 0 10E3/UL (ref 0–0.7)
EOSINOPHIL NFR BLD AUTO: 0 %
ERYTHROCYTE [DISTWIDTH] IN BLOOD BY AUTOMATED COUNT: 12.9 % (ref 10–15)
ERYTHROCYTE [DISTWIDTH] IN BLOOD BY AUTOMATED COUNT: 12.9 % (ref 10–15)
GLUCOSE BLDC GLUCOMTR-MCNC: 127 MG/DL (ref 70–99)
GLUCOSE SERPL-MCNC: 118 MG/DL (ref 70–99)
HCT VFR BLD AUTO: 33.1 % (ref 35–47)
HCT VFR BLD AUTO: 34.2 % (ref 35–47)
HGB BLD-MCNC: 11.3 G/DL (ref 11.7–15.7)
HGB BLD-MCNC: 11.6 G/DL (ref 11.7–15.7)
HOLD SPECIMEN: NORMAL
IMM GRANULOCYTES # BLD: 0 10E3/UL
IMM GRANULOCYTES NFR BLD: 0 %
LVEF ECHO: NORMAL
LYMPHOCYTES # BLD AUTO: 1.8 10E3/UL (ref 0.8–5.3)
LYMPHOCYTES NFR BLD AUTO: 20 %
MAGNESIUM SERPL-MCNC: 1.6 MG/DL (ref 1.7–2.3)
MCH RBC QN AUTO: 30.8 PG (ref 26.5–33)
MCH RBC QN AUTO: 31.2 PG (ref 26.5–33)
MCHC RBC AUTO-ENTMCNC: 33 G/DL (ref 31.5–36.5)
MCHC RBC AUTO-ENTMCNC: 35 G/DL (ref 31.5–36.5)
MCV RBC AUTO: 89 FL (ref 78–100)
MCV RBC AUTO: 93 FL (ref 78–100)
MONOCYTES # BLD AUTO: 0.7 10E3/UL (ref 0–1.3)
MONOCYTES NFR BLD AUTO: 8 %
NEUTROPHILS # BLD AUTO: 6.4 10E3/UL (ref 1.6–8.3)
NEUTROPHILS NFR BLD AUTO: 72 %
NRBC # BLD AUTO: 0 10E3/UL
NRBC BLD AUTO-RTO: 0 /100
PHOSPHATE SERPL-MCNC: 5 MG/DL (ref 2.5–4.5)
PLATELET # BLD AUTO: 101 10E3/UL (ref 150–450)
PLATELET # BLD AUTO: 99 10E3/UL (ref 150–450)
POTASSIUM SERPL-SCNC: 4 MMOL/L (ref 3.4–5.3)
RBC # BLD AUTO: 3.67 10E6/UL (ref 3.8–5.2)
RBC # BLD AUTO: 3.72 10E6/UL (ref 3.8–5.2)
SODIUM SERPL-SCNC: 139 MMOL/L (ref 135–145)
WBC # BLD AUTO: 8.5 10E3/UL (ref 4–11)
WBC # BLD AUTO: 9 10E3/UL (ref 4–11)

## 2023-10-26 PROCEDURE — 83735 ASSAY OF MAGNESIUM: CPT | Performed by: STUDENT IN AN ORGANIZED HEALTH CARE EDUCATION/TRAINING PROGRAM

## 2023-10-26 PROCEDURE — 36416 COLLJ CAPILLARY BLOOD SPEC: CPT | Performed by: STUDENT IN AN ORGANIZED HEALTH CARE EDUCATION/TRAINING PROGRAM

## 2023-10-26 PROCEDURE — 93010 ELECTROCARDIOGRAM REPORT: CPT | Performed by: INTERNAL MEDICINE

## 2023-10-26 PROCEDURE — 250N000013 HC RX MED GY IP 250 OP 250 PS 637: Performed by: STUDENT IN AN ORGANIZED HEALTH CARE EDUCATION/TRAINING PROGRAM

## 2023-10-26 PROCEDURE — 85027 COMPLETE CBC AUTOMATED: CPT

## 2023-10-26 PROCEDURE — 97165 OT EVAL LOW COMPLEX 30 MIN: CPT | Mod: GO

## 2023-10-26 PROCEDURE — 84100 ASSAY OF PHOSPHORUS: CPT | Performed by: STUDENT IN AN ORGANIZED HEALTH CARE EDUCATION/TRAINING PROGRAM

## 2023-10-26 PROCEDURE — 93306 TTE W/DOPPLER COMPLETE: CPT

## 2023-10-26 PROCEDURE — 36415 COLL VENOUS BLD VENIPUNCTURE: CPT | Performed by: STUDENT IN AN ORGANIZED HEALTH CARE EDUCATION/TRAINING PROGRAM

## 2023-10-26 PROCEDURE — 99233 SBSQ HOSP IP/OBS HIGH 50: CPT | Mod: 24 | Performed by: NURSE PRACTITIONER

## 2023-10-26 PROCEDURE — 250N000011 HC RX IP 250 OP 636: Mod: JZ | Performed by: INTERNAL MEDICINE

## 2023-10-26 PROCEDURE — 250N000013 HC RX MED GY IP 250 OP 250 PS 637: Performed by: NURSE PRACTITIONER

## 2023-10-26 PROCEDURE — 258N000003 HC RX IP 258 OP 636: Performed by: NURSE PRACTITIONER

## 2023-10-26 PROCEDURE — 85025 COMPLETE CBC W/AUTO DIFF WBC: CPT | Performed by: STUDENT IN AN ORGANIZED HEALTH CARE EDUCATION/TRAINING PROGRAM

## 2023-10-26 PROCEDURE — 250N000013 HC RX MED GY IP 250 OP 250 PS 637

## 2023-10-26 PROCEDURE — 93306 TTE W/DOPPLER COMPLETE: CPT | Mod: 26 | Performed by: INTERNAL MEDICINE

## 2023-10-26 PROCEDURE — 120N000003 HC R&B IMCU UMMC

## 2023-10-26 PROCEDURE — 97530 THERAPEUTIC ACTIVITIES: CPT | Mod: GO

## 2023-10-26 PROCEDURE — 93010 ELECTROCARDIOGRAM REPORT: CPT | Mod: 76 | Performed by: INTERNAL MEDICINE

## 2023-10-26 PROCEDURE — 80048 BASIC METABOLIC PNL TOTAL CA: CPT | Performed by: STUDENT IN AN ORGANIZED HEALTH CARE EDUCATION/TRAINING PROGRAM

## 2023-10-26 PROCEDURE — 36415 COLL VENOUS BLD VENIPUNCTURE: CPT

## 2023-10-26 PROCEDURE — 93005 ELECTROCARDIOGRAM TRACING: CPT

## 2023-10-26 RX ORDER — HYDROCHLOROTHIAZIDE 25 MG/1
25 TABLET ORAL DAILY
COMMUNITY
Start: 2023-10-26

## 2023-10-26 RX ORDER — HYDROCHLOROTHIAZIDE 25 MG/1
25 TABLET ORAL DAILY
Status: DISCONTINUED | OUTPATIENT
Start: 2023-10-27 | End: 2023-10-27 | Stop reason: HOSPADM

## 2023-10-26 RX ORDER — HYDROCHLOROTHIAZIDE 25 MG/1
50 TABLET ORAL DAILY
Status: DISCONTINUED | OUTPATIENT
Start: 2023-10-26 | End: 2023-10-26

## 2023-10-26 RX ORDER — METOPROLOL SUCCINATE 100 MG/1
100 TABLET, EXTENDED RELEASE ORAL DAILY
Status: DISCONTINUED | OUTPATIENT
Start: 2023-10-26 | End: 2023-10-27 | Stop reason: HOSPADM

## 2023-10-26 RX ORDER — HYDROCHLOROTHIAZIDE 25 MG/1
50 TABLET ORAL DAILY
COMMUNITY
Start: 2023-10-26 | End: 2023-10-26

## 2023-10-26 RX ORDER — ASPIRIN 81 MG/1
81 TABLET ORAL DAILY
Qty: 90 TABLET | Refills: 3 | Status: SHIPPED | OUTPATIENT
Start: 2023-10-26 | End: 2024-01-22

## 2023-10-26 RX ORDER — MAGNESIUM SULFATE HEPTAHYDRATE 40 MG/ML
2 INJECTION, SOLUTION INTRAVENOUS ONCE
Status: COMPLETED | OUTPATIENT
Start: 2023-10-26 | End: 2023-10-26

## 2023-10-26 RX ORDER — METOPROLOL SUCCINATE 25 MG/1
100 TABLET, EXTENDED RELEASE ORAL DAILY
COMMUNITY
Start: 2023-10-26

## 2023-10-26 RX ADMIN — MAGNESIUM SULFATE HEPTAHYDRATE 2 G: 40 INJECTION, SOLUTION INTRAVENOUS at 08:42

## 2023-10-26 RX ADMIN — HYDRALAZINE HYDROCHLORIDE 25 MG: 25 TABLET, FILM COATED ORAL at 03:54

## 2023-10-26 RX ADMIN — ASPIRIN 81 MG: 81 TABLET ORAL at 07:42

## 2023-10-26 RX ADMIN — SODIUM CHLORIDE 500 ML: 9 INJECTION, SOLUTION INTRAVENOUS at 11:01

## 2023-10-26 RX ADMIN — ACETAMINOPHEN 650 MG: 325 TABLET, FILM COATED ORAL at 12:52

## 2023-10-26 RX ADMIN — ATORVASTATIN CALCIUM 20 MG: 20 TABLET, FILM COATED ORAL at 07:43

## 2023-10-26 ASSESSMENT — ACTIVITIES OF DAILY LIVING (ADL)
ADLS_ACUITY_SCORE: 26
PREVIOUS_RESPONSIBILITIES: MEAL PREP;HOUSEKEEPING;LAUNDRY;SHOPPING;MEDICATION MANAGEMENT;FINANCES;DRIVING
ADLS_ACUITY_SCORE: 26

## 2023-10-26 NOTE — PROGRESS NOTES
10/26/23 1300   Appointment Info   Signing Clinician's Name / Credentials (OT) PAULY Cali   Student Supervision On-site supervision provided;Direct supervision provided   Living Environment   People in Home child(christian), adult   Current Living Arrangements house   Home Accessibility stairs to enter home   Number of Stairs, Main Entrance 2   Stair Railings, Main Entrance railings safe and in good condition   Number of Stairs, Within Home, Primary none   Transportation Anticipated family or friend will provide   Living Environment Comments Pt lives with her adult daughter in a house and has stairs within the house that she doesn't utilize   Self-Care   Usual Activity Tolerance moderate   Current Activity Tolerance poor   Regular Exercise No   Equipment Currently Used at Home walker, standard   Fall history within last six months no   Activity/Exercise/Self-Care Comment Pt has a walker but has not used it yet   Instrumental Activities of Daily Living (IADL)   Previous Responsibilities meal prep;housekeeping;laundry;shopping;medication management;finances;driving   IADL Comments Pt was able to do all her household chores until recently, her daighter has been helping with heavier IADLS.   General Information   Onset of Illness/Injury or Date of Surgery 09/25/23   Referring Physician Jl Vogel MD   Patient/Family Therapy Goal Statement (OT) IND with activity tolerance and I/ADLS   Additional Occupational Profile Info/Pertinent History of Current Problem Lacey Casarez is a 78 year old female with a history of essential hypertension, SVT, prediabetes, elevated BMI and severe aortic stenosis who is admitted for planned TAVR procedure with Dr. Heredia.   Existing Precautions/Restrictions cardiac   Left Upper Extremity (Weight-bearing Status) full weight-bearing (FWB)   Right Upper Extremity (Weight-bearing Status) full weight-bearing (FWB)   Left Lower Extremity (Weight-bearing Status) full  weight-bearing (FWB)   Right Lower Extremity (Weight-bearing Status) full weight-bearing (FWB)   Cognitive Status Examination   Orientation Status orientation to person, place and time   Cognitive Status Comments cognition Intact   Visual Perception   Visual Impairment/Limitations corrective lenses full-time   Sensory   Sensory Quick Adds sensation intact   Posture   Posture not impaired   Range of Motion Comprehensive   General Range of Motion no range of motion deficits identified   Strength Comprehensive (MMT)   General Manual Muscle Testing (MMT) Assessment no strength deficits identified   Muscle Tone Assessment   Muscle Tone Quick Adds No deficits were identified   Coordination   Upper Extremity Coordination No deficits were identified   Bed Mobility   Bed Mobility No deficits identified   Transfers   Transfers No deficits identified   Balance   Balance Assessment no deficits identified   Activities of Daily Living   BADL Assessment/Intervention toileting;bathing   Bathing Assessment/Intervention   Position (Bathing) supported sitting   [place] (Bathing) supervision   Assistive Devices (Bathing) shower chair   Toileting   Position (Toileting) supported sitting   Assistive Devices (Toileting) commode chair   [place] (Toileting) supervision   Clinical Impression   Criteria for Skilled Therapeutic Interventions Met (OT) Yes, treatment indicated   OT Diagnosis decrease activity tolerance and IND with IADLS   OT Problem List-Impairments impacting ADL problems related to;activity tolerance impaired;pain;post-surgical precautions;strength;mobility   Assessment of Occupational Performance 1-3 Performance Deficits   Planned Therapy Interventions (OT) IADL retraining;risk factor education;progressive activity/exercise;home program guidelines;strengthening   Clinical Decision Making Complexity (OT) problem focused assessment/low complexity   Risk & Benefits of therapy have been explained  evaluation/treatment results reviewed;care plan/treatment goals reviewed;risks/benefits reviewed;current/potential barriers reviewed;participants voiced agreement with care plan;participants included;patient   OT Total Evaluation Time   OT Eval, Low Complexity Minutes (24881) 10   OT Goals   Therapy Frequency (OT) Daily   OT Predicted Duration/Target Date for Goal Attainment 10/31/23   OT Goals Toilet Transfer/Toileting;Cardiac Phase 1;Home Management   OT: Toilet Transfer/Toileting Supervision/stand-by assist   OT: Home Management Supervision/stand-by assist   OT: Understanding of cardiac education to maximize quality of life, condition management, and health outcomes Patient   OT: Perform aerobic activity with stable cardiovascular response intermittent;15 minutes   OT: Functional/aerobic ambulation tolerance with stable cardiovascular response in order to return to home and community environment Supervision/SBA;Greater than 300 feet   OT: Navigation of stairs simulating home set up with stable cardiovascular response in order to return to home and community environment Supervision/SBA;6 stairs   OT Discharge Planning   OT Plan Ambulation, standing tolerance, toilet/tub transfer   OT Discharge Recommendation (DC Rec) home with assist;home with outpatient cardiac rehab   OT Rationale for DC Rec OT: Pt below baseline PLOF. Pt decrease in activity tolerance and unable to ambulate safely due to weakness and post procedure hypotension. Pt reported not having help at home. Anticipate pt will be safe to discharge home once medically ready and has support at home, advice against d/c home this date due to weakness and hypotension.   OT Brief overview of current status CGA   Total Session Time   Total Session Time (sum of timed and untimed services) 10

## 2023-10-26 NOTE — PROGRESS NOTES
"Pt lost consciousness     Writer saw Pt in morning rounds at 07:30.  Pt was awake, A+Ox4, pleasant, cooperative.   Pt's VS taken at that time - stable.  Pt asked writer for a boost in bed for breakfast.  RN & NST boosted Pt in bed, then lifted HOB to about 60 degrees.  Pt started eating breakfast.  At 07:43 RN noted that Pt quiet and appears sleeping in front of her food tray.  Pt was not answering any questions and did not react to sternal rub.  RN called \"staff assist\"   BP taken, Tele - SR w/PVC's, HR 80's,  02 - 100% RA  Rapid team called, 02- 3 LNC given to Pt, BG checked - 127.  Vitals monitored Q5 min  Primary team notified.  Pt slowly got back to orientation, was able to answer questions appropriately.  Pt had no c/o chest pain or any concerns.   L & R groin sites intact, neuro's intact.    Pt now fully awake, A+Ox4, in bed, no issues reported.     Will monitor closely.    Rylan Andrew RN on 10/26/2023 at 9:18 AM        "

## 2023-10-26 NOTE — PROGRESS NOTES
D-S/p TAVR on 10/26/23. Anticipated discharge to home today, but pt had an episode of unresponsiveness in am, possibly related to documented orthostatic hypotension. Bilateral groin sites are C/D/I. See flow sheets for vs and assessments.  I-Pt instructed to call for assistance to get out of bed. Up with 2 assists and gait belt for safety.  A-Telemetry shows SR.  P-Continue with current poc. Notify CSI provider of any concerns/changes. Anticipate discharge to home tomorrow.

## 2023-10-26 NOTE — PROGRESS NOTES
"BP (!) 102/38   Pulse 95   Temp 97.8  F (36.6  C) (Oral)   Resp 29   Ht 1.6 m (5' 2.99\")   Wt 70.9 kg (156 lb 4.9 oz)   SpO2 97%   BMI 27.70 kg/m      Hours of Care: 5042-9904.    Neuro: A+Ox4, pleasant  Vitals:  Soft BP, orthostatic drop   Respiratory:  RA  Cardiac:  SR w/PVC's    GI/:  continent, PureWick when in bed, commode during the day   Skin/Wounds:  L +R groin sites C/D/I  Lines:  PIVx1  Diet:  2g Na diet, good appetite  Activity:  1 SBA w/walker & belt  Pain:  denies  Labs:  K & Mg protocols, Mg replaced today    Plan:  Home tomorrow.      Continue to monitor and follow POC    Rylan Andrew RN on 10/26/2023 at 2:24 PM    6C-Intermediate Care       "

## 2023-10-26 NOTE — PROGRESS NOTES
Interventional Cardiology Progress Note      Assessment & Plan:  Lacey Casarez is a 78 year old female with a history of essential hypertension, SVT, prediabetes, elevated BMI and severe aortic stenosis who is admitted for planned TAVR procedure with Dr. Heredia.     Today's Update:  - unresponsive episode this AM, seen bedside by interventionalist  - Orthostatic +, IV bolus  - resume BB per Dr. Heredia recs and intermittent SVT  - Discharge tomorrow    # Severe aortic stenosis  # s/p TAVR  # Acute on chronic heart failure with preserved ejection fraction  Prior to procedure, pt noted to have a mean gradient 50 mmHG, PAUL 0.88 cm^2, PV 4.7 m/s. Now s/p TAVR with 23 mm Edward Terell valve. MG 4 mmHg, trace AI.  Pre procedure LVEDP 29 mmHg. Procedure was uncomplicated.      - Groin sites CDI no hematoma, swelling/bleeding   - EKG POD 1: no new BBB/conduction issues   - Echocardiogram POD 1: EF > 65%, valve well seated, PV 2.5 m/s, MG 12 mmHg, no valvular or paravalvular AI  - Aspirin 81 mg for anti-platelet therapy.   - Strict I&O  - K and Mag protocols  - Daily BMP     # Unresponsive episode  # Orthostatic hypotension  Unresponsive episode POD 1 while RN in room. BP and HR stable during event, patient had just been repositioned so possibly orthostasis. Neurologically intact, fully recovered in less than 5 minutes  - Discussed with Dr. Heredia, recommend discharge on [employer] MCOT monitor  - IV bolus X 1, encouraged PO intake     Chronic Issues:  HTN: continue PTA Metoprolol, continue PTA Lisinopril 40 mg daily and hydrochlorothiazide 25 mg daily  SVT: resuming due to intermittent SVT post TAVR per Dr. Heredia's recommendations.  Pre-diabetes: most recent A1C 5.6%  Elevated BMI: BMI 29, encouraged healthy lifestyle  modifications    DVT Prophylaxis: ambulation  Diet: Regular  Activity: up with assist  Code Status: FULL  Disposition: tomorrow    Patient discussed w/ Dr. Heredia.    Akosua Abreu  "APRN, CNP  [facility]  Interventional Cardiology  [phone]    Medical Decision Making       60 MINUTES SPENT BY ME on the date of service doing chart review, history, exam, documentation & further activities per the note.      Interval History:  NAEO, telemetry, labs, vital signs and nursing notes reviewed.    Most recent vital signs:  BP (!) 102/38   Pulse 95   Temp 97.8  F (36.6  C) (Oral)   Resp 29   Ht 1.6 m (5' 2.99\")   Wt 70.9 kg (156 lb 4.9 oz)   SpO2 97%   BMI 27.70 kg/m    Temp:  [97.4  F (36.3  C)-98.4  F (36.9  C)] 97.8  F (36.6  C)  Pulse:  [] 95  Resp:  [0-29] 29  BP: ()/() 102/38  SpO2:  [96 %-100 %] 97 %  Wt Readings from Last 2 Encounters:   10/25/23 70.9 kg (156 lb 4.9 oz)   10/23/23 72 kg (158 lb 11.7 oz)       Intake/Output Summary (Last 24 hours) at 10/26/2023 1437  Last data filed at 10/26/2023 1300  Gross per 24 hour   Intake 820 ml   Output 700 ml   Net 120 ml       Physical exam:  General: Pleasant elderly female. Appears comfortable and in no acute distress. Alert and interactive  HEENT: Normocephalic, atraumatic. No scleral icterus or injection  Neck: JVP not elevated  CARDIAC: Regular rate and rhythm, no m/r/g appreciated. Peripheral pulses 2+  RESP: Normal work of breathing on room air without use of accessory breathing muscles. Clear to auscultation in all fields. No wheezes, rhonchi or crackles appreciated.  GI: No abdominal distention. Soft and nontender.   EXTREMITIES: Without lower extremity edema. No cyanosis or clubbing. Warm and well perfused. No venous stasis changes. Femoral access sites are c/d/I without hematoma or bruit.   SKIN: No acute lesions appreciated. Warm and dry to touch  NEURO: Alert and oriented X3, CN II-XII grossly intact, no focal neurological deficits noted, normal speech  PSYCH: Mood and affect are appropriate    Labs (Past three days):  CBC  Recent Labs   Lab 10/26/23  0810 10/26/23  0713 10/23/23  1546 " "  WBC 9.0 8.5 5.4   RBC 3.67* 3.72* 4.18   HGB 11.3* 11.6* 12.8   HCT 34.2* 33.1* 38.5   MCV 93 89 92   MCH 30.8 31.2 30.6   MCHC 33.0 35.0 33.2   RDW 12.9 12.9 12.6   * 99* 144*     BMP  Recent Labs   Lab 10/26/23  0749 10/26/23  0713 10/23/23  1546   NA  --  139 139   POTASSIUM  --  4.0 3.8   CHLORIDE  --  102 100   CO2  --  23 28   ANIONGAP  --  14 11   * 118* 106*   BUN  --  20.3 19.3   CR  --  0.67 0.60   GFRESTIMATED  --  88 >90   BUSHRA  --  9.5 9.6   MAG  --  1.6*  --    PHOS  --  5.0*  --      Troponins: No results found for: \"TROPI\", \"TROPONIN\", \"TROPR\", \"TROPN\"     INR  Recent Labs   Lab 10/23/23  1546   INR 1.15     Liver panelNo lab results found in last 7 days.    Imaging/procedure results:  EKG 10/26/23 POD 1:       Echocardiogram 10/26/23 POD 1:  Interpretation Summary  TAVR with 23mm Guevara Resilia S3 on 10/25/23.  Valve is well seated with normal doppler assessment. PV 2.5m/s, MG 12mmHg; no  valvular or paravalvular AI.  Left ventricular function is normal.The ejection fraction is > 65%.  Global RV function appears normal on limited views.  The inferior vena cava is normal.  No pericardial effusion.  ___________________     TAVR 10/25/23:  Structural Valves     TAVR     PROCEDURE REPORT:    TAVR PROCEDURE REPORT:     PROCEDURES PERFORMED:   1. Iliofemoral artery angiography.  2. Ascending aorta angiography.  3. Left heart catheterization.  4. Successful transfemoral transcatheter aortic valve replacement with a 23mm Guevara Terell 3 valve.  5. Arteriotomy closure.    PHYSICIANS:  1. Attending Interventional Cardiology Staff: Dr Heredia  2. Attending Cardiovascular Surgery Staff: Dr Mulvihill  3. Structural Interventional Cardiology Fellow: Dr Vogel     INDICATION:  Lacey Casarez is a 78 year old female with severe symptomatic aortic stenosis who presents on an elective outpatient basis for transfemoral transcatheter aortic valve replacement with plan for an Guevara Terell 3 " valve.    DESCRIPTION:  1. Consent obtained with discussion of risks.  All questions were answered.  2. Sterile prep and procedure per OR protocol.  3. Location: right and left common femoral arteries and left common femoral vein.  4. Access: Local anesthetic with lidocaine.  A standard (18 g) needle with ultrasound guidance was used to establish vascular access using a modified Seldinger technique.  5. Sheath:  14Fr Ugevara eSheath in the RCFA, 7Fr long sheath in the LCFA,  4 long sheath in the RCFV  6. Catheters: 6Fr angled pigtail and 6Fr AL-1.  7. Estimated blood loss of 20 mL.  8. See below for procedue details.    MEDICATIONS:  1. Sedation per anesthesia.  2. Heparin administered to achieve a goal ACT > 300 sec per anesthesia.   3. Protamine IV.    SUMMARY:  1. Access was obtained in the right and left common femoral arteries and the right common femoral vein by the interventional cardiology team.  The arterial site used for valve delivery was pre-closed with two Perclose Proglide devices.   2. Iliofemoral angiography was used to guide RCFA access for insertion of the valve sheath.  A Lunderquist wire was used to support the Guevara eSheath insertion.    3. The 6Fr pigtail catheter was positioned in the right-coronary cusp and angiography was used to confirm the optimal implant angle.  The pigtail was then moved to the non-coronary cusp.    4. Access into the LV was obtained using an AL-1 catheter and a straight wire.  The AL-1 catheter was used to exchange the straight wire for a J-wire and a pigtail catheter was then positioned in the left ventricle.  The LVEDP was measured with a pressure of 29 mmHg.  The pigtail catheter was used to advance a Savvy wire into in the LV and the pigtail was removed.    5. The 23mm Guevara Terell 3 prosthetic valve was then positioned across the native aortic valve in a 80/20 position.  Angiography and echocardiography were used to confirm optimal valve position.  Rapid pacing  at 180 bpm was initiated and the valve was implanted with expansion of the balloon using a final balloon volume of nominal mL.  6. Subsequent transthoracic echocardiography and an ascending aortogram showed trace paravalvular insufficiency after Savvy wire was removed. Mean gradient was 4 mmHg by TTE.     8. The valve sheath access arteriotomy was successfully closed with the double suture closure devices.     9. A final iliofemoral angiogram showed no evidence of extravasation or stenosis.   10. The LCFA 7Fr arteriotomy was successfully closed with a 8Fr AngioSeal closure device.    NOTABLE EVENTS:  1. None    COMPLICATIONS:  1. None    SUMMARY:    1. Acute on chronic diastolic heart failure  2. Lifestyle-limiting severe aortic stenosis.  3. Successful transfemoral transcatheter aortic valve replacement with a 23mm Guevara Resilia Terell 3 valve (nominal).  4. Left heart catheterization with LVEDP of 29 mmHg.  5. Right and left common femoral arteriotomies successfully closed with closure devices.    PLAN:    1. Aspirin 81 mg po daily lifelong.  2. Bedrest per protocol.  3. Admit to the primary inpatient team for further evaluation and management.  4. Echocardiogram tomorrow.   5. Lifelong antibiotic prophylaxis prior to all dental procedures.      The attending interventional cardiologists were present and participated in the entire procedure.

## 2023-10-26 NOTE — DISCHARGE INSTRUCTIONS
Going home after Transcatheter Aortic Valve Replacement (TAVR)  Femoral Access    You have small procedure sites at both groins, the one on the right is slightly bigger. You may have small amounts of bruising or discomfort, this is expected. If you develop worse swelling, redness and warmth that does not go away, fever and chills, Increasing numbness in your legs, worsening pain at the site then you need to contact your nurse coordinator.    You may shower, but do not soak in a bath tub or pool or apply lotions, ointments, powder, etc for 3-5 days or until sites look healed.     Activity: No lifting, pushing, pulling more than 10 pounds (examples to avoid: groceries, vacuuming, gardening, golfing): For one week with a procedure through the groin.    After the initial healing process of the access site, we recommend cardiac rehabilitation for all TAVR patients. Cardiac rehabilitation will help you:  - Rebuild stamina, strength and balance.  - Learn how to participate in activities safely, as well as help you regain confidence to do so.  - Return to activities of daily living and leisure.  Please contact their central scheduling to arrange at [phone]    We prefer you to follow up with your primary care provider within 2 weeks. You will be arranged to see the TAVR clinic in month. At that time you will have a repeat ultrasound of the heart to look at the valve.     Should you have any questions or concerns please contact your assigned TAVR nurse coordinator:  Felicia [phone] (at the first prompt enter #1, second prompt enter #3, then ask the triage nurse if you can speak with Felicia).

## 2023-10-26 NOTE — DISCHARGE SUMMARY
"I Professor Gaytan, saw and evaluated the patient as part of a shared APRN/PA visit. I have seen and examined the patient with the CSI team and reviewed the pertinent laboratory results. I agree with the assessment and plan of the discharge summary note above. I spent 35 minutes face-to-face and/or coordinating care. Over 50% of the time on the unit was spent counseling the patient and/or coordinating care as documented above.     Pawel Gaytan MD  Interventional Cardiology  Pager: [phone]    [address] [address]  p: [phone]     Discharge Summary: Cardiology Service     Lacey Casarez MRN# [id number]   YOB: 1945 Age: 78 year old         Admission Date: 10/25/2023  Discharge Date: 10/27/2023     Discharge Diagnoses:  # Severe aortic stenosis s/p TAVR  # Acute on chronic heart failure with preserved ejection fraction  # WM  # Acute on Chronic Anemia  # HTN  # SVT  # Pre-diabetes  # Elevated BMI     Brief HPI:  Lacey \"Wily\" Leida is a 78 year old female with a history of essential hypertension, SVT, prediabetes, elevated BMI and severe aortic stenosis who was admitted 10/25 for planned TAVR procedure with Dr. Heredia.     Hospital Course by Diagnosis:  # Severe aortic stenosis  # s/p TAVR  # Acute on chronic heart failure with preserved ejection fraction  Prior to procedure, pt noted to have a mean gradient 50 mmHG, PAUL 0.88 cm^2, PV 4.7 m/s. Now s/p TAVR with 23 mm Edward Terell valve. MG 4 mmHg, trace AI.  Pre procedure LVEDP 29 mmHg. Procedure was uncomplicated. POD 1 patient had an unresponsive episode that occurred when she was getting repositioned, patient unsure if she \"blacked out\" or not. BP and HR stable during event. Felt to be orthostasis. Patient fully recovered in minutes. Kept an additional night for monitoring. Patient reports feeling well this am, has some reproducible chest pain on left side, " otherwise no complaints.      - Groin sites CDI no hematoma, swelling/bleeding   - EKG POD 1: no new BBB/conduction issues   - Echocardiogram POD 1: EF > 65%, valve well seated, PV 2.5 m/s, MG 12 mmHg, no valvular or paravalvular AI  - Aspirin 81 mg for anti-platelet therapy.   - Follow up with Dee MÉNDEZ 10/30/23  - Discussed with Dr. Heredia, recommend discharge on [employer] MCOT monitor, Ok to resume PTA Toprol    # WM  Cr 1.11 this am, up from 0.6, likely 2/2 procedural contrast and brief episode of HOTN.   - Repeat Cr in 1 week    # Acute on Chronic Anemia  Hgb drop 11.3-->9.5. Patient with no overt signs of bleeding  - Likely post procedural  - repeat hgb 1 week     Chronic Issues:  HTN: continue PTA Metoprolol, continue PTA Lisinopril 40 mg daily and hydrochlorothiazide 25 mg daily  SVT: resuming due to intermittent SVT post TAVR per Dr. Heredia's recommendations.  Pre-diabetes: most recent A1C 5.6%  Elevated BMI: BMI 29, encouraged healthy lifestyle  modifications     Pertinent Procedures:  TAVR 10/25/23     Consults:  RNCC/SW  PT/OT     Medication Changes:  None     Discharge medications:   Current Discharge Medication List              CONTINUE these medications which have NOT CHANGED     Details   acetaminophen (TYLENOL) 500 MG tablet Maximum acetaminophen dose is 4000 mg in 24 hours       albuterol (PROAIR HFA/PROVENTIL HFA/VENTOLIN HFA) 108 (90 Base) MCG/ACT inhaler Inhale 1-2 Puffs every 6 hours as needed for Wheezing.       aspirin (ASA) 325 MG EC tablet Take 325 mg by mouth daily       atorvastatin (LIPITOR) 20 MG tablet Take 20 mg by mouth daily       fluticasone (FLOVENT HFA) 44 MCG/ACT inhaler Inhale 2 puffs into the lungs       hydrochlorothiazide (HYDRODIURIL) 25 MG tablet Take 25 mg by mouth daily       lisinopril (ZESTRIL) 40 MG tablet Take 40 mg by mouth daily       metoprolol succinate ER (TOPROL XL) 100 MG 24 hr tablet Take 100 mg by mouth daily       blood glucose (ONETOUCH  VERIO IQ) test strip TEST BLOOD SUGAR ONCE WEEKLY.                 Follow-up:  - PCP 7-10 days for post hospitalization visit  - Structural Cardiology, with Dee MÉNDEZ CNP 10/30/23 for TAVR follow up    Imaging:  - Repeat echocardiogram 1 month   - BMP 1 week     Code status:  FULL     Condition on discharge  Temp:  [97.5  F (36.4  C)-98.2  F (36.8  C)] 97.5  F (36.4  C)  Pulse:  [58-73] 68  Resp:  [10-20] 14  BP: ()/() 132/65  SpO2:  [95 %-100 %] 98 %  General: Alert, interactive, no acute distress  HEENT: Normocephalic, atraumatic. Sclera anicteric.   Neck: JVP not elevated  Cardiovascular: Regular rate and rhythm, normal S1 and S2, no murmurs, gallops, or rubs. Radial and pedal pulses palpable bilaterally.   Resp: Regular work of breathing on room air. Clear to auscultation bilaterally, no rales, wheezes, or rhonchi  GI: Soft, nontender, nondistended.   Extremities: no edema, no cyanosis or clubbing, warm and well perfused. Groin sites CDI, soft, non-tender to touch, no signs of hematoma  Skin: Warm and dry, no jaundice or rash  Neuro: Alert and oriented x 3. CN 2-12 intact, moves all extremities equally, normal speech  Psych: Mood and affect are appropriate     Imaging with results:  EKG 10/26/23 POD 1: NSR HR 98       Echocardiogram 10/26/23 POD 1:  Interpretation Summary  TAVR with 23mm Guevara Resilia S3 on 10/25/23.  Valve is well seated with normal doppler assessment. PV 2.5m/s, MG 12mmHg; no  valvular or paravalvular AI.  Left ventricular function is normal.The ejection fraction is > 65%.  Global RV function appears normal on limited views.  The inferior vena cava is normal.  No pericardial effusion.    Echocardiogram (intra op): 10/25/2023:  Interpretation Summary  PROCEDURE  Intra-procedural transthoracic echocardiogram for transfemoral transcatheter  aortic valve replacement with 23MM ESTRELLITA 3 ULTRA. Image acquisition  by sonographer.     PRE-PROCEDURAL FINDINGS:  1. Severe calcific  aortic stenosis.  2. Normal systolic function. Visual LVEF 55%.     POST-PROCEDURAL SURVEY:  1. 23MM ESTRELLITA 3 ULTRA Aortic Valve was successfully deployed.  2. Valve is well seated. There is trivial periprosthetic regurgitation.  3. Post-procedure valve hemodynamics - mean gradient 4.0 mmHg.  4. No pericardial effusion                  Recent Labs   Lab 10/23/23  1546      POTASSIUM 3.8   CHLORIDE 100   CO2 28   ANIONGAP 11   *   BUN 19.3   CR 0.60   GFRESTIMATED >90   BUSHRA 9.6            Patient Care Team:  Tiff Sparrow MD as PCP - General (Family Medicine)        Time Spent on this Encounter [x]Expand by Default  I, HONEY Carl CNP, personally saw the patient today and spent greater than 30 minutes discharging this patient.     >30 minutes spent in discharge, including >50% in counseling and coordination of care, medication review and plan of care recommended on follow up. Questions were answered.   It was our pleasure to care for Lacey Casarez during this hospitalization. Please do not hesitate to contact me should there be questions regarding the hospital course or discharge plan.    Patient discussed with staff cardiologist, Dr. Gaytan, who agrees with the above documentation and plan. Documentation represents joint decision making.      HONEY Garay, CNP  [facility] Cardiology

## 2023-10-26 NOTE — CODE/RAPID RESPONSE
Rapid Response Team Note    Assessment   A rapid response was called on Lacey Casarez due to  transient syncope . Per RN, pt was hard to respond for about 30 seconds and gradually got back to full orientation for over couple minutes. Vitals remained stable throughout. Pt was eating at onset of incident.   - on Arrival of RRT, she is fully oriented with stable vitals. Cardiac exam without concerning finding. Ordered follow up EKG; ordered repeat CBC for the morning lab hemolyzed.   B    Plan   -  overall seems transient episode but given underlying cardiac procedure needs close monitoring and continued assessment. Slow recovery also warrant neurologic monitoring.   - Cardiothoracic team came to bedside and took over the care.   - continue monitoring and further care per primary.     Flo Lane PA-C on 10/26/2023 at 8:12 AM

## 2023-10-27 ENCOUNTER — APPOINTMENT (OUTPATIENT)
Dept: OCCUPATIONAL THERAPY | Facility: CLINIC | Age: 78
DRG: 266 | End: 2023-10-27
Attending: INTERNAL MEDICINE
Payer: COMMERCIAL

## 2023-10-27 ENCOUNTER — APPOINTMENT (OUTPATIENT)
Dept: CARDIOLOGY | Facility: CLINIC | Age: 78
DRG: 266 | End: 2023-10-27
Attending: NURSE PRACTITIONER
Payer: COMMERCIAL

## 2023-10-27 VITALS
HEIGHT: 63 IN | HEART RATE: 72 BPM | WEIGHT: 157.2 LBS | OXYGEN SATURATION: 99 % | TEMPERATURE: 98.9 F | DIASTOLIC BLOOD PRESSURE: 56 MMHG | SYSTOLIC BLOOD PRESSURE: 138 MMHG | BODY MASS INDEX: 27.85 KG/M2 | RESPIRATION RATE: 21 BRPM

## 2023-10-27 LAB
ANION GAP SERPL CALCULATED.3IONS-SCNC: 10 MMOL/L (ref 7–15)
ATRIAL RATE - MUSE: 61 BPM
ATRIAL RATE - MUSE: 77 BPM
ATRIAL RATE - MUSE: 98 BPM
BUN SERPL-MCNC: 36.8 MG/DL (ref 8–23)
CALCIUM SERPL-MCNC: 8.6 MG/DL (ref 8.8–10.2)
CHLORIDE SERPL-SCNC: 100 MMOL/L (ref 98–107)
CREAT SERPL-MCNC: 1.11 MG/DL (ref 0.51–0.95)
DEPRECATED HCO3 PLAS-SCNC: 26 MMOL/L (ref 22–29)
DIASTOLIC BLOOD PRESSURE - MUSE: NORMAL MMHG
EGFRCR SERPLBLD CKD-EPI 2021: 50 ML/MIN/1.73M2
ERYTHROCYTE [DISTWIDTH] IN BLOOD BY AUTOMATED COUNT: 13 % (ref 10–15)
GLUCOSE SERPL-MCNC: 110 MG/DL (ref 70–99)
HCT VFR BLD AUTO: 29.3 % (ref 35–47)
HGB BLD-MCNC: 9.5 G/DL (ref 11.7–15.7)
INTERPRETATION ECG - MUSE: NORMAL
MAGNESIUM SERPL-MCNC: 2.3 MG/DL (ref 1.7–2.3)
MCH RBC QN AUTO: 30.7 PG (ref 26.5–33)
MCHC RBC AUTO-ENTMCNC: 32.4 G/DL (ref 31.5–36.5)
MCV RBC AUTO: 95 FL (ref 78–100)
P AXIS - MUSE: 38 DEGREES
P AXIS - MUSE: 61 DEGREES
P AXIS - MUSE: 62 DEGREES
PHOSPHATE SERPL-MCNC: 4.3 MG/DL (ref 2.5–4.5)
PLATELET # BLD AUTO: 66 10E3/UL (ref 150–450)
POTASSIUM SERPL-SCNC: 3.8 MMOL/L (ref 3.4–5.3)
PR INTERVAL - MUSE: 142 MS
PR INTERVAL - MUSE: 146 MS
PR INTERVAL - MUSE: 170 MS
QRS DURATION - MUSE: 100 MS
QRS DURATION - MUSE: 80 MS
QRS DURATION - MUSE: 92 MS
QT - MUSE: 370 MS
QT - MUSE: 398 MS
QT - MUSE: 444 MS
QTC - MUSE: 446 MS
QTC - MUSE: 450 MS
QTC - MUSE: 472 MS
R AXIS - MUSE: 36 DEGREES
R AXIS - MUSE: 45 DEGREES
R AXIS - MUSE: 58 DEGREES
RBC # BLD AUTO: 3.09 10E6/UL (ref 3.8–5.2)
SODIUM SERPL-SCNC: 136 MMOL/L (ref 135–145)
SYSTOLIC BLOOD PRESSURE - MUSE: NORMAL MMHG
T AXIS - MUSE: -44 DEGREES
T AXIS - MUSE: 232 DEGREES
T AXIS - MUSE: 263 DEGREES
VENTRICULAR RATE- MUSE: 61 BPM
VENTRICULAR RATE- MUSE: 77 BPM
VENTRICULAR RATE- MUSE: 98 BPM
WBC # BLD AUTO: 6.9 10E3/UL (ref 4–11)

## 2023-10-27 PROCEDURE — 250N000013 HC RX MED GY IP 250 OP 250 PS 637: Performed by: STUDENT IN AN ORGANIZED HEALTH CARE EDUCATION/TRAINING PROGRAM

## 2023-10-27 PROCEDURE — 999N000096 CARDIAC MOBILE TELEMETRY MONITOR

## 2023-10-27 PROCEDURE — 36415 COLL VENOUS BLD VENIPUNCTURE: CPT | Performed by: STUDENT IN AN ORGANIZED HEALTH CARE EDUCATION/TRAINING PROGRAM

## 2023-10-27 PROCEDURE — 84100 ASSAY OF PHOSPHORUS: CPT | Performed by: STUDENT IN AN ORGANIZED HEALTH CARE EDUCATION/TRAINING PROGRAM

## 2023-10-27 PROCEDURE — 250N000013 HC RX MED GY IP 250 OP 250 PS 637: Performed by: NURSE PRACTITIONER

## 2023-10-27 PROCEDURE — 99239 HOSP IP/OBS DSCHRG MGMT >30: CPT | Mod: 24 | Performed by: NURSE PRACTITIONER

## 2023-10-27 PROCEDURE — 83735 ASSAY OF MAGNESIUM: CPT | Performed by: STUDENT IN AN ORGANIZED HEALTH CARE EDUCATION/TRAINING PROGRAM

## 2023-10-27 PROCEDURE — 85018 HEMOGLOBIN: CPT | Performed by: STUDENT IN AN ORGANIZED HEALTH CARE EDUCATION/TRAINING PROGRAM

## 2023-10-27 PROCEDURE — 93228 REMOTE 30 DAY ECG REV/REPORT: CPT | Performed by: INTERNAL MEDICINE

## 2023-10-27 PROCEDURE — 97530 THERAPEUTIC ACTIVITIES: CPT | Mod: GO

## 2023-10-27 PROCEDURE — 93010 ELECTROCARDIOGRAM REPORT: CPT | Performed by: INTERNAL MEDICINE

## 2023-10-27 PROCEDURE — 93005 ELECTROCARDIOGRAM TRACING: CPT

## 2023-10-27 PROCEDURE — 80048 BASIC METABOLIC PNL TOTAL CA: CPT | Performed by: STUDENT IN AN ORGANIZED HEALTH CARE EDUCATION/TRAINING PROGRAM

## 2023-10-27 RX ADMIN — ATORVASTATIN CALCIUM 20 MG: 20 TABLET, FILM COATED ORAL at 08:19

## 2023-10-27 RX ADMIN — ASPIRIN 81 MG: 81 TABLET ORAL at 08:19

## 2023-10-27 RX ADMIN — METOPROLOL SUCCINATE 100 MG: 100 TABLET, EXTENDED RELEASE ORAL at 08:19

## 2023-10-27 RX ADMIN — LISINOPRIL 40 MG: 20 TABLET ORAL at 08:19

## 2023-10-27 RX ADMIN — ACETAMINOPHEN 650 MG: 325 TABLET, FILM COATED ORAL at 02:47

## 2023-10-27 ASSESSMENT — ACTIVITIES OF DAILY LIVING (ADL)
ADLS_ACUITY_SCORE: 26
ADLS_ACUITY_SCORE: 22
ADLS_ACUITY_SCORE: 26
ADLS_ACUITY_SCORE: 22
ADLS_ACUITY_SCORE: 26

## 2023-10-27 NOTE — PLAN OF CARE
Neuro: A&Ox4. Call light appropriate.   Cardiac: SR. VSS.   Respiratory: Sating 98% on R.A.   GI/: No output since 1PM, bladder scanned 25ml. Encourage fluid intake, will  re-scan at 10PM with 30mls. Encouraged to sit up in the commode, patient was able to void.  Activity:  SBA  Pain: Tyelnol given for headache with relief  Skin: No new deficits noted.  LDA's: Left PIV saline locked.     Plan: Continue with POC. Notify primary team with changes.

## 2023-10-27 NOTE — PROGRESS NOTES
DISCHARGE   Discharged to: Home  Via: Automobile  Accompanied by: Family  Discharge Instructions: diet, activity, medications, follow up appointments, when to call the MD, and what to watchout for (i.e. s/s of infection, increasing SOB, palpitations, chest pain,)  Prescriptions: To be filled by discharge pharmacy per pt's request; medication list reviewed & sent with pt  Follow Up Appointments: arranged; information given  Belongings: All sent with pt  IV: out  Telemetry: off  Pt exhibits understanding of above discharge instructions; all questions answered.  Discharge Paperwork: given to patient    Additional education:  - apply pressure to site for 15 minutes if bleeding or hematoma occurs. If hematoma/bleeding persists,call 911.   -note for fast heart rate  -educated family in stroke symptoms and call 911 immediately  -increase activity as tolerated.     Patient walked outside of room without walker. Steady upon ambulation   2022 12:05

## 2023-10-27 NOTE — PLAN OF CARE
Goal Outcome Evaluation:       Pt improving as evidenced by:        Neuro: Alert and oriented x 4. Appropriate speech. KOCH's.  Cardiac/Telemetry: Monitor with SR   Respiratory: Lungs Cta, diminished   GI/: Up to void in BR. Abd soft with positive BS. Last BM 10/24.  Diet/Appetite: 2 gram sodium   Skin: Right and left groin sites C/D/I.   LDA: Has peripheral IV.   Activity: Up in room independently. Mostly has remained in bed.Napping intermittently.   Pain: Denies pain.  Labs: No electrolyte replacement needed.  Plan:  Pt ot discharge after 5pm.

## 2023-10-28 LAB
ATRIAL RATE - MUSE: 96 BPM
DIASTOLIC BLOOD PRESSURE - MUSE: NORMAL MMHG
INTERPRETATION ECG - MUSE: NORMAL
P AXIS - MUSE: 44 DEGREES
PR INTERVAL - MUSE: 132 MS
QRS DURATION - MUSE: 90 MS
QT - MUSE: 378 MS
QTC - MUSE: 477 MS
R AXIS - MUSE: 40 DEGREES
SYSTOLIC BLOOD PRESSURE - MUSE: NORMAL MMHG
T AXIS - MUSE: -56 DEGREES
VENTRICULAR RATE- MUSE: 96 BPM

## 2023-10-28 NOTE — PLAN OF CARE
Occupational Therapy and Cardiac Rehab Discharge Summary    Reason for therapy discharge:    Discharged to home with outpatient therapy.    Progress towards therapy goal(s). See goals on [facility] Plan in [facility] electronic health record for goal details.  Goals partially met.  Barriers to achieving goals:   discharge from facility.    Therapy recommendation(s):    Continued therapy is recommended.  Rationale/Recommendations:  CAIT MARTE.

## 2023-10-30 ENCOUNTER — PATIENT OUTREACH (OUTPATIENT)
Dept: CARE COORDINATION | Facility: CLINIC | Age: 78
End: 2023-10-30
Payer: COMMERCIAL

## 2023-10-30 ENCOUNTER — VIRTUAL VISIT (OUTPATIENT)
Dept: CARDIOLOGY | Facility: CLINIC | Age: 78
End: 2023-10-30
Attending: NURSE PRACTITIONER
Payer: COMMERCIAL

## 2023-10-30 VITALS — BODY MASS INDEX: 28.89 KG/M2 | WEIGHT: 157 LBS | HEIGHT: 62 IN

## 2023-10-30 DIAGNOSIS — Z95.2 S/P TAVR (TRANSCATHETER AORTIC VALVE REPLACEMENT): Primary | ICD-10-CM

## 2023-10-30 PROCEDURE — 99213 OFFICE O/P EST LOW 20 MIN: CPT | Mod: 95 | Performed by: NURSE PRACTITIONER

## 2023-10-30 ASSESSMENT — PAIN SCALES - GENERAL: PAINLEVEL: NO PAIN (0)

## 2023-10-30 NOTE — LETTER
10/30/2023      RE: Lacey Casarez  [address]  [facility] [address]       Dear Colleague,    Thank you for the opportunity to participate in the care of your patient, Lacey Casarez, at the [facility]ART [facility] [place] at [facility]. Please see a copy of my visit note below.        [facility] HEART CARE  CARDIOVASCULAR DIVISION    VALVE CLINIC RETURN VISIT    PRIMARY CARDIOLOGIST: [facility]       PERTINENT CLINICAL HISTORY:     Lacey Casarez is a very pleasant 78 year old female who presents for 1 week TAVR follow-up. She has a history of severe aortic valvular stenosis with NYHA class II heart failure symptoms that was treated with a transfemoral transcatheter aortic valve replacement (TAVR) with a 23mm Guevara Terell 3 Resilia on 10/25/23 by Clarice Heredia and Jaspreet.  The TAVR and post-procedural course were notable for syncope and orthostatic hypotension which resolved with IVF. Her EKG showed no conduction abnormalities, telemetry showed paroxysmal SVT. Her PTA BB was resumed. Her POD #1 echo showed normal TAVR function  with mean PG 12 mmHG without PVL. She was discharged on ASA 81 mg daily and cardiac monitor to evaluate for arrhythmias.     She has been doing well. She has been walking short distances in the house. She has no chest pain, shortness of breath, dizziness, palpitations, syncope. Occasional headaches takes tylenol. Also reports occasional blurred vision, has happened 2-3 times, last 5 minutes and spontaneously resolves. Has had both blurred vision and headaches pre TAVR. No neuro symptoms at the moment. Her groin sites are healing well. She overall has no concerns. Scheduled for cardiac rehab on 11/7/23.      PAST MEDICAL HISTORY:   HTN  HLD  Pre diabetes  Paroxysmal SVT  Severe aortic stenosis s/p TAVR  Chronic diastolic heart failure, resolved post TAVR      PAST SURGICAL HISTORY:     Past Surgical History:    Procedure Laterality Date    CV CORONARY ANGIOGRAM N/A 10/9/2023    Procedure: Coronary Angiogram;  Surgeon: Nick Lagos MD;  Location:  HEART CARDIAC CATH LAB    CV RIGHT HEART CATH MEASUREMENTS RECORDED N/A 10/9/2023    Procedure: Right Heart Catheterization;  Surgeon: Nick Lagos MD;  Location:  HEART CARDIAC CATH LAB      CURRENT MEDICATIONS:     Current Outpatient Medications   Medication Sig Dispense Refill    acetaminophen (TYLENOL) 500 MG tablet Take 500-1,000 mg by mouth every 6 hours as needed for mild pain or fever      albuterol (PROAIR HFA/PROVENTIL HFA/VENTOLIN HFA) 108 (90 Base) MCG/ACT inhaler Inhale 1-2 Puffs every 6 hours as needed for Wheezing.      aspirin 81 MG EC tablet Take 1 tablet (81 mg) by mouth daily 90 tablet 3    atorvastatin (LIPITOR) 20 MG tablet Take 20 mg by mouth daily      blood glucose (ONETOUCH VERIO IQ) test strip TEST BLOOD SUGAR ONCE WEEKLY.      fluticasone (FLOVENT HFA) 44 MCG/ACT inhaler Inhale 2 puffs into the lungs 2 times daily      hydrochlorothiazide (HYDRODIURIL) 25 MG tablet Take 1 tablet (25 mg) by mouth daily      lisinopril (ZESTRIL) 40 MG tablet Take 40 mg by mouth daily      metoprolol succinate ER (TOPROL XL) 25 MG 24 hr tablet Take 4 tablets (100 mg) by mouth daily          ALLERGIES:   No Known Allergies     FAMILY HISTORY:   No family history on file.     SOCIAL HISTORY:     Social History     Socioeconomic History    Marital status:    Tobacco Use    Smoking status: Never    Smokeless tobacco: Never      REVIEW OF SYSTEMS:     Constitutional: No fevers or chills  Skin: No new rash or itching  Eyes: No acute change in vision  Ears/Nose/Throat: No purulent rhinorrhea, new hearing loss, or new vertigo  Respiratory: No cough or hemoptysis  Cardiovascular: See HPI  Gastrointestinal: No change in appetite, vomiting, hematemesis or diarrhea  Genitourinary: No dysuria or hematuria  Musculoskeletal: No new back  pain, neck pain or muscle pain  Neurologic: No new headaches, focal weakness or behavior changes  Psychiatric: No hallucinations, excessive alcohol consumption or illegal drug usage  Hematologic/Lymphatic/Immunologic: No bleeding, chills, fever, night sweats or weight loss  Endocrine: No new cold intolerance, heat intolerance, polyphagia, polydipsia or polyuria      PHYSICAL EXAMINATION:   No VS or exam due to phone visit      LABORATORY DATA:     CBC RESULTS:  Lab Results   Component Value Date    WBC 6.9 10/27/2023    RBC 3.09 (L) 10/27/2023    HGB 9.5 (L) 10/27/2023    HCT 29.3 (L) 10/27/2023    MCV 95 10/27/2023    MCH 30.7 10/27/2023    MCHC 32.4 10/27/2023    RDW 13.0 10/27/2023    PLT 66 (L) 10/27/2023       BMP RESULTS:  Lab Results   Component Value Date     10/27/2023    POTASSIUM 3.8 10/27/2023    CHLORIDE 100 10/27/2023    CO2 26 10/27/2023    ANIONGAP 10 10/27/2023     (H) 10/27/2023     (H) 10/26/2023    BUN 36.8 (H) 10/27/2023    CR 1.11 (H) 10/27/2023    GFRESTIMATED 50 (L) 10/27/2023    BUSHRA 8.6 (L) 10/27/2023         PROCEDURES & FURTHER ASSESSMENTS:   EKG 10/26/23 POD 1: NSR HR 98       Echocardiogram 10/26/23 POD 1:  Interpretation Summary  TAVR with 23mm Guevara Resilia S3 on 10/25/23.  Valve is well seated with normal doppler assessment. PV 2.5m/s, MG 12mmHg; no  valvular or paravalvular AI.  Left ventricular function is normal.The ejection fraction is > 65%.  Global RV function appears normal on limited views.  The inferior vena cava is normal.  No pericardial effusion.     Echocardiogram (intra op): 10/25/2023:  Interpretation Summary  PROCEDURE  Intra-procedural transthoracic echocardiogram for transfemoral transcatheter  aortic valve replacement with 23MM ESTRELLITA 3 ULTRA. Image acquisition  by sonographer.     PRE-PROCEDURAL FINDINGS:  1. Severe calcific aortic stenosis.  2. Normal systolic function. Visual LVEF 55%.     POST-PROCEDURAL SURVEY:  1. 23MM ESTRELLITA 3 ULTRA  Aortic Valve was successfully deployed.  2. Valve is well seated. There is trivial periprosthetic regurgitation.  3. Post-procedure valve hemodynamics - mean gradient 4.0 mmHg.  4. No pericardial effusion    NYHA Class: I     CLINICAL IMPRESSION:       Lacey Casarez is a very pleasant 78 year old female who presents for 1 week TAVR follow-up. She has a history of severe aortic valvular stenosis with NYHA class II heart failure symptoms that was treated with a transfemoral transcatheter aortic valve replacement (TAVR) with a 23mm Guevara Terell 3 Resilia on 10/25/23 by Clarice Heredia and Jaspreet.  The TAVR and post-procedural course were notable for syncope and orthostatic hypotension which resolved with IVF. Her EKG showed no conduction abnormalities, telemetry showed paroxysmal SVT. Her PTA BB was resumed. Her POD #1 echo showed normal TAVR function  with mean PG 12 mmHG without PVL. She was discharged on ASA 81 mg daily and cardiac monitor to evaluate for arrhythmias.     She has been doing well. She has been walking short distances in the house. She has no chest pain, shortness of breath, dizziness, palpitations, syncope. Occasional headaches takes tylenol. Also reports occasional blurred vision, has happened 2-3 times, last 5 minutes and spontaneously resolves. Has had both blurred vision and headaches pre TAVR. No neuro symptoms at the moment. Her groin sites are healing well. She overall has no concerns. Scheduled for cardiac rehab on 11/7/23 at [facility]. She would like to attend cardiac rehab in [place] instead. Plan to fax orders.     Continue ASA 81 mg daily lifelong  SBE prophylaxis lifelong  Cardiac rehab [place]  Follow-up 1 month with echo, labs and EKG prior    HONEY Preston, CNP  [facility] Cardiology Team    CC  Patient Care Team:  Tiff Sparrow MD as PCP - General (Family Medicine)  Joni Duarte MD as Assigned Heart and Vascular Provider

## 2023-10-30 NOTE — NURSING NOTE
Patient confirms medications and allergies are accurate via patients echeck in completion, and or denies any changes since last reviewed/verified.         Katie Garcia, Virtual Facilitator  Is the patient currently in the state of [place]? YES    Visit mode:TELEPHONE    If the visit is dropped, the patient can be reconnected by: TELEPHONE VISIT: Phone number:   Telephone Information:   [phone]        Will anyone else be joining the visit? NO  (If patient encounters technical issues they should call [phone]    How would you like to obtain your AVS? MyChart    Are changes needed to the allergy or medication list? No    Reason for visit: RECHECK    Katie Garcia VVF

## 2023-10-30 NOTE — PROGRESS NOTES
Virtual Visit Details    Type of service:  Telephone Visit   Phone call duration: 15 minutes     [facility] HEART CARE  CARDIOVASCULAR DIVISION    VALVE CLINIC RETURN VISIT    PRIMARY CARDIOLOGIST: [facility]       PERTINENT CLINICAL HISTORY:     Lacey Casarez is a very pleasant 78 year old female who presents for 1 week TAVR follow-up. She has a history of severe aortic valvular stenosis with NYHA class II heart failure symptoms that was treated with a transfemoral transcatheter aortic valve replacement (TAVR) with a 23mm Guevara Terell 3 Resilia on 10/25/23 by Clarice Heredia and Jaspreet.  The TAVR and post-procedural course were notable for syncope and orthostatic hypotension which resolved with IVF. Her EKG showed no conduction abnormalities, telemetry showed paroxysmal SVT. Her PTA BB was resumed. Her POD #1 echo showed normal TAVR function  with mean PG 12 mmHG without PVL. She was discharged on ASA 81 mg daily and cardiac monitor to evaluate for arrhythmias.     She has been doing well. She has been walking short distances in the house. She has no chest pain, shortness of breath, dizziness, palpitations, syncope. Occasional headaches takes tylenol. Also reports occasional blurred vision, has happened 2-3 times, last 5 minutes and spontaneously resolves. Has had both blurred vision and headaches pre TAVR. No neuro symptoms at the moment. Her groin sites are healing well. She overall has no concerns. Scheduled for cardiac rehab on 11/7/23.      PAST MEDICAL HISTORY:   HTN  HLD  Pre diabetes  Paroxysmal SVT  Severe aortic stenosis s/p TAVR  Chronic diastolic heart failure, resolved post TAVR      PAST SURGICAL HISTORY:     Past Surgical History:   Procedure Laterality Date    CV CORONARY ANGIOGRAM N/A 10/9/2023    Procedure: Coronary Angiogram;  Surgeon: Nick Lagos MD;  Location:  HEART CARDIAC CATH LAB    CV RIGHT HEART CATH MEASUREMENTS RECORDED N/A 10/9/2023    Procedure:  Right Heart Catheterization;  Surgeon: Nick Lagos MD;  Location:  HEART CARDIAC CATH LAB      CURRENT MEDICATIONS:     Current Outpatient Medications   Medication Sig Dispense Refill    acetaminophen (TYLENOL) 500 MG tablet Take 500-1,000 mg by mouth every 6 hours as needed for mild pain or fever      albuterol (PROAIR HFA/PROVENTIL HFA/VENTOLIN HFA) 108 (90 Base) MCG/ACT inhaler Inhale 1-2 Puffs every 6 hours as needed for Wheezing.      aspirin 81 MG EC tablet Take 1 tablet (81 mg) by mouth daily 90 tablet 3    atorvastatin (LIPITOR) 20 MG tablet Take 20 mg by mouth daily      blood glucose (ONETOUCH VERIO IQ) test strip TEST BLOOD SUGAR ONCE WEEKLY.      fluticasone (FLOVENT HFA) 44 MCG/ACT inhaler Inhale 2 puffs into the lungs 2 times daily      hydrochlorothiazide (HYDRODIURIL) 25 MG tablet Take 1 tablet (25 mg) by mouth daily      lisinopril (ZESTRIL) 40 MG tablet Take 40 mg by mouth daily      metoprolol succinate ER (TOPROL XL) 25 MG 24 hr tablet Take 4 tablets (100 mg) by mouth daily          ALLERGIES:   No Known Allergies     FAMILY HISTORY:   No family history on file.     SOCIAL HISTORY:     Social History     Socioeconomic History    Marital status:    Tobacco Use    Smoking status: Never    Smokeless tobacco: Never      REVIEW OF SYSTEMS:     Constitutional: No fevers or chills  Skin: No new rash or itching  Eyes: No acute change in vision  Ears/Nose/Throat: No purulent rhinorrhea, new hearing loss, or new vertigo  Respiratory: No cough or hemoptysis  Cardiovascular: See HPI  Gastrointestinal: No change in appetite, vomiting, hematemesis or diarrhea  Genitourinary: No dysuria or hematuria  Musculoskeletal: No new back pain, neck pain or muscle pain  Neurologic: No new headaches, focal weakness or behavior changes  Psychiatric: No hallucinations, excessive alcohol consumption or illegal drug usage  Hematologic/Lymphatic/Immunologic: No bleeding, chills, fever, night sweats  or weight loss  Endocrine: No new cold intolerance, heat intolerance, polyphagia, polydipsia or polyuria      PHYSICAL EXAMINATION:   No VS or exam due to phone visit      LABORATORY DATA:     CBC RESULTS:  Lab Results   Component Value Date    WBC 6.9 10/27/2023    RBC 3.09 (L) 10/27/2023    HGB 9.5 (L) 10/27/2023    HCT 29.3 (L) 10/27/2023    MCV 95 10/27/2023    MCH 30.7 10/27/2023    MCHC 32.4 10/27/2023    RDW 13.0 10/27/2023    PLT 66 (L) 10/27/2023       BMP RESULTS:  Lab Results   Component Value Date     10/27/2023    POTASSIUM 3.8 10/27/2023    CHLORIDE 100 10/27/2023    CO2 26 10/27/2023    ANIONGAP 10 10/27/2023     (H) 10/27/2023     (H) 10/26/2023    BUN 36.8 (H) 10/27/2023    CR 1.11 (H) 10/27/2023    GFRESTIMATED 50 (L) 10/27/2023    BUSHRA 8.6 (L) 10/27/2023         PROCEDURES & FURTHER ASSESSMENTS:   EKG 10/26/23 POD 1: NSR HR 98       Echocardiogram 10/26/23 POD 1:  Interpretation Summary  TAVR with 23mm Guevara Resilia S3 on 10/25/23.  Valve is well seated with normal doppler assessment. PV 2.5m/s, MG 12mmHg; no  valvular or paravalvular AI.  Left ventricular function is normal.The ejection fraction is > 65%.  Global RV function appears normal on limited views.  The inferior vena cava is normal.  No pericardial effusion.     Echocardiogram (intra op): 10/25/2023:  Interpretation Summary  PROCEDURE  Intra-procedural transthoracic echocardiogram for transfemoral transcatheter  aortic valve replacement with 23MM ESTRELLITA 3 ULTRA. Image acquisition  by sonographer.     PRE-PROCEDURAL FINDINGS:  1. Severe calcific aortic stenosis.  2. Normal systolic function. Visual LVEF 55%.     POST-PROCEDURAL SURVEY:  1. 23MM ESTRELLITA 3 ULTRA Aortic Valve was successfully deployed.  2. Valve is well seated. There is trivial periprosthetic regurgitation.  3. Post-procedure valve hemodynamics - mean gradient 4.0 mmHg.  4. No pericardial effusion    NYHA Class: I     CLINICAL IMPRESSION:       Lacey MACKEY  Leida is a very pleasant 78 year old female who presents for 1 week TAVR follow-up. She has a history of severe aortic valvular stenosis with NYHA class II heart failure symptoms that was treated with a transfemoral transcatheter aortic valve replacement (TAVR) with a 23mm Ugevara Terell 3 Resilia on 10/25/23 by Clarice Heredia and Jaspreet.  The TAVR and post-procedural course were notable for syncope and orthostatic hypotension which resolved with IVF. Her EKG showed no conduction abnormalities, telemetry showed paroxysmal SVT. Her PTA BB was resumed. Her POD #1 echo showed normal TAVR function  with mean PG 12 mmHG without PVL. She was discharged on ASA 81 mg daily and cardiac monitor to evaluate for arrhythmias.     She has been doing well. She has been walking short distances in the house. She has no chest pain, shortness of breath, dizziness, palpitations, syncope. Occasional headaches takes tylenol. Also reports occasional blurred vision, has happened 2-3 times, last 5 minutes and spontaneously resolves. Has had both blurred vision and headaches pre TAVR. No neuro symptoms at the moment. Her groin sites are healing well. She overall has no concerns. Scheduled for cardiac rehab on 11/7/23 at [facility]. She would like to attend cardiac rehab in [place] instead. Plan to fax orders.     Continue ASA 81 mg daily lifelong  SBE prophylaxis lifelong  Cardiac rehab [place]  Follow-up 1 month with echo, labs and EKG prior    HONEY Preston, CNP  [facility] Cardiology Team    CC  Patient Care Team:  Tiff Sparrow MD as PCP - General (Family Medicine)  Joni Duarte MD as Assigned Heart and Vascular Provider

## 2023-10-30 NOTE — PROGRESS NOTES
[facility]    Background: Transitional Care Management program identified per system criteria and reviewed by [facility] team for possible outreach.    Assessment:  Upon chart review, patient is in communication with a clinic team member, nurse or provider within [facility] for reason of discussing hospital follow up plan of care and answering questions patient may have related to discharge instructions. [facility] Team member will update episode status of Transitional Care Management program to enrolled per system workflows.     [facility] made first outreach attempt on 10/29 to reach patient for hospital follow up and left message and that time.    Patient has an appointment today with an NP in Cardiovascular Disease from [facility] [place]. Patient's appointment today is appropriate for ED/Hospital discharge and follow-up. No [facility] outreach call needed at this time. Second review second outreach.    Plan: [facility] will make no additional outreach attempts to minimize duplicative efforts and reduce potential confusion for patient.      ESTRELLITA Leyva  [phone]  [facility]     *Connected Care Resource Team does NOT follow patient ongoing. Referrals are identified based on internal discharge reports and the outreach is to ensure patient has an understanding of their discharge instructions.

## 2023-11-02 ENCOUNTER — CARE COORDINATION (OUTPATIENT)
Dept: CARDIOLOGY | Facility: CLINIC | Age: 78
End: 2023-11-02
Payer: COMMERCIAL

## 2023-11-02 NOTE — PATIENT INSTRUCTIONS
You were seen today in the Structural Heart Clinic at the [facility].    Cardiology provider you saw during your visit: Dee Lopez NP    Instructions:   Continue aspirin 81 mg daily lifelong.  Delay any nonurgent dental procedures for the first 6 month post TAVR.  For all future dental cleanings and procedures you will need to take antibiotics prior - see instructions below.   Start cardiac rehab [place]   Follow-up in Valve Clinic in 1 month with echo, labs and ECG prior     Prevention of Infective (Bacterial) Endocarditis:  You are at increased risk for developing adverse outcomes from infective endocarditis (IE), also known as bacterial endocarditis (BE) because of the new device in your heart. The guidelines for prevention of IE are to give patients antibiotics prior to any dental procedures that involve manipulation of gingival tissue or the periapical region of teeth, or perforation of the oral mucosa:      It is recommended to take Amoxicillin 2 gm by mouth as a single dose 30 to 60 minutes before procedure.     OR if allergic to Penicillin or Ampicillin:     Cephalexin 2 gm by mouth, or  Clindamycin 600 mg by mouth, or  Azithromycin or Clarithromycin 500 mg PO       Questions and scheduling:   First call: Structural Heart  Macie Gonzalez [phone]    General scheduling line: [phone].   First press #1 for the University and then press #3 for Medical Questions to reach the Cardiology triage nurse.     On Call Cardiologist for after hours or on weekends: [phone], press option #4 and ask to speak to the on-call Cardiologist.

## 2023-11-15 ENCOUNTER — HOSPITAL ENCOUNTER (OUTPATIENT)
Dept: CARDIAC REHAB | Facility: CLINIC | Age: 78
Discharge: HOME | End: 2023-11-15
Attending: STUDENT IN AN ORGANIZED HEALTH CARE EDUCATION/TRAINING PROGRAM
Payer: COMMERCIAL

## 2023-11-15 DIAGNOSIS — I35.0 SEVERE AORTIC STENOSIS: ICD-10-CM

## 2023-11-21 ENCOUNTER — TELEPHONE (OUTPATIENT)
Dept: CARDIOLOGY | Facility: CLINIC | Age: 78
End: 2023-11-21
Payer: COMMERCIAL

## 2023-11-21 NOTE — TELEPHONE ENCOUNTER
[facility] Call Center    Phone Message    May a detailed message be left on voicemail: yes     Reason for Call: Other: Lacey called stating her daughter got the flu last week and now Lacey has a slight cough and is wondering if she is okay to take robitussin for her cough. Please reach out to Lacey to discuss. Thank you!     Action Taken: Other: Cardiology    Travel Screening: Not Applicable    Thank you!  [facility]

## 2023-11-21 NOTE — TELEPHONE ENCOUNTER
Return call made to Wily. She states that she has a mild cough, and this mostly happens at night when she is in bed. She was wondering if it was OK for her to take cough medicine.     She was told that it is OK, but that she needs to follow the medicine's instructions for proper dosing. She verbalized understanding to this information.

## 2023-11-24 NOTE — PROGRESS NOTES
[facility]  CARDIOVASCULAR DIVISION    VALVE CLINIC RETURN VISIT    PRIMARY CARDIOLOGIST: [facility]       PERTINENT CLINICAL HISTORY:     Lacey Casarez is a very pleasant 78 year old female who presents for 1 month TAVR follow-up. She has a history of severe aortic valvular stenosis with NYHA class II heart failure symptoms that was treated with a transfemoral transcatheter aortic valve replacement (TAVR) with a 23mm Guevara Terell 3 Resilia on 10/25/23 by Clarice Heredia and Jaspreet.  The TAVR and post-procedural course were notable for syncope and orthostatic hypotension which resolved with IVF. Her EKG showed no conduction abnormalities, telemetry showed paroxysmal SVT. Her PTA metoprolol was resumed. Her POD #1 echo showed normal TAVR function with mean PG 12 mmHG without PVL. She was discharged on ASA 81 mg daily and cardiac monitor to evaluate for arrhythmias.     She has been doing well. She has been walking 15-20 minutes daily around the house. She has no chest pain, shortness of breath, lightheadedness, palpitations, syncope. Last Friday she had an episode of blurred vision and dizziness after she was standing cooking for a long period of time. She had to sit back in her chair, symptoms resolved within a minute. Otherwise no other episodes of dizziness/lightheadedness. No neurology symptoms. Her groin sites are fully healed. She will be starting cardiac rehab in the near future. She does not check home blood pressure. She is consistently high in clinic. Notes she has not taken BP medications this morning.      PAST MEDICAL HISTORY:   HTN  HLD  Pre diabetes  Paroxysmal SVT  Severe aortic stenosis s/p TAVR  Chronic diastolic heart failure, resolved post TAVR      PAST SURGICAL HISTORY:     Past Surgical History:   Procedure Laterality Date    CV CORONARY ANGIOGRAM N/A 10/9/2023    Procedure: Coronary Angiogram;  Surgeon: Nick Lagos MD;  Location: [facility] CARDIAC  CATH LAB    CV RIGHT HEART CATH MEASUREMENTS RECORDED N/A 10/9/2023    Procedure: Right Heart Catheterization;  Surgeon: Nick Lagos MD;  Location:  HEART CARDIAC CATH LAB    CV TRANSCATHETER AORTIC VALVE REPLACEMENT-FEMORAL APPROACH N/A 10/25/2023    Procedure: Transcatheter aortic valve replacement with any indicated procedure- Guevara.;  Surgeon: Jarocho Heredia MD;  Location: [facility] OR    OR TRANSCATHETER AORTIC VALVE REPLACEMENT, FEMORAL PERCUTANEOUS APPROACH (STANDBY) N/A 10/25/2023    Procedure: OR TRANSCATHETER AORTIC VALVE REPLACEMENT, OPEN FEMORAL ARTERY APPROACH- Guevara;  Surgeon: Dinh Rodriguez MD;  Location:  OR      CURRENT MEDICATIONS:     Current Outpatient Medications   Medication Sig Dispense Refill    acetaminophen (TYLENOL) 500 MG tablet Take 500-1,000 mg by mouth every 6 hours as needed for mild pain or fever      albuterol (PROAIR HFA/PROVENTIL HFA/VENTOLIN HFA) 108 (90 Base) MCG/ACT inhaler Inhale 1-2 Puffs every 6 hours as needed for Wheezing.      aspirin 81 MG EC tablet Take 1 tablet (81 mg) by mouth daily 90 tablet 3    atorvastatin (LIPITOR) 20 MG tablet Take 20 mg by mouth daily      blood glucose (ONETOUCH VERIO IQ) test strip TEST BLOOD SUGAR ONCE WEEKLY.      fluticasone (FLOVENT HFA) 44 MCG/ACT inhaler Inhale 2 puffs into the lungs 2 times daily      hydrochlorothiazide (HYDRODIURIL) 25 MG tablet Take 1 tablet (25 mg) by mouth daily      lisinopril (ZESTRIL) 40 MG tablet Take 40 mg by mouth daily      metoprolol succinate ER (TOPROL XL) 25 MG 24 hr tablet Take 4 tablets (100 mg) by mouth daily          ALLERGIES:   No Known Allergies     FAMILY HISTORY:   No family history on file.     SOCIAL HISTORY:     Social History     Socioeconomic History    Marital status:    Tobacco Use    Smoking status: Never    Smokeless tobacco: Never      REVIEW OF SYSTEMS:     Constitutional: No fevers or chills  Skin: No new rash or itching  Eyes: No acute  change in vision  Ears/Nose/Throat: No purulent rhinorrhea, new hearing loss, or new vertigo  Respiratory: No cough or hemoptysis  Cardiovascular: See HPI  Gastrointestinal: No change in appetite, vomiting, hematemesis or diarrhea  Genitourinary: No dysuria or hematuria  Musculoskeletal: No new back pain, neck pain or muscle pain  Neurologic: No new headaches, focal weakness or behavior changes  Psychiatric: No hallucinations, excessive alcohol consumption or illegal drug usage  Hematologic/Lymphatic/Immunologic: No bleeding, chills, fever, night sweats or weight loss  Endocrine: No new cold intolerance, heat intolerance, polyphagia, polydipsia or polyuria      PHYSICAL EXAMINATION:   BP (!) 177/81 (BP Location: Right arm, Patient Position: Chair, Cuff Size: Adult Regular)   Pulse 67   Wt 71.2 kg (156 lb 14.4 oz)   SpO2 100%   BMI 28.70 kg/m      Orthostatic Vitals from 11/25/23 1216 to 11/27/23 1216    Date and Time Orthostatic BP Orthostatic Pulse Patient Position BP   Location Cuff Size   11/27/23 1103 151/75 66 Chair Right arm Adult Regular   11/27/23 1102 167/76 65 Chair Right arm Adult Regular   11/27/23 1101 171/78 65 Chair Right arm Adult Regular   11/27/23 1024 -- -- Chair Right arm Adult Regular     General: NAD  CV: Normal S1S2  Resp: Clear LS  Extremities: Normal pulses  Neuro: Normal     LABORATORY DATA:     CBC RESULTS:  Lab Results   Component Value Date    WBC 6.9 10/27/2023    RBC 3.09 (L) 10/27/2023    HGB 9.5 (L) 10/27/2023    HCT 29.3 (L) 10/27/2023    MCV 95 10/27/2023    MCH 30.7 10/27/2023    MCHC 32.4 10/27/2023    RDW 13.0 10/27/2023    PLT 66 (L) 10/27/2023       BMP RESULTS:  Lab Results   Component Value Date     10/27/2023    POTASSIUM 3.8 10/27/2023    CHLORIDE 100 10/27/2023    CO2 26 10/27/2023    ANIONGAP 10 10/27/2023     (H) 10/27/2023     (H) 10/26/2023    BUN 36.8 (H) 10/27/2023    CR 1.11 (H) 10/27/2023    GFRESTIMATED 50 (L) 10/27/2023    BUSHRA 8.6 (L)  10/27/2023         PROCEDURES & FURTHER ASSESSMENTS:       EKG 11/27/23:  NSR HR 67, normal QRSd 82 ms     ECHO 11/27/23:  Interpretation Summary  S/P TAVR with 23mm Guevara Resilia S3 on 10/25/23.  The mean gradient across the aortic valve is 14 mmHg.  Trace paravalvular AI.  No significant changes noted.  ______________________________________________________________________________  Left Ventricle  S/P TAVR with 23mm Guevara Resilia S3 on 10/25/23. Global and regional left  ventricular function is normal with an EF of 60-65%. Left ventricular wall  thickness is normal. Left ventricular size is normal. Left ventricular  diastolic function is indeterminate. Diastolic Doppler findings (E/E' ratio  and/or other parameters) suggest left ventricular filling pressures are  increased. No regional wall motion abnormalities are seen.     Right Ventricle  Right ventricular function, chamber size, wall motion, and thickness are  normal.     Atria  Both atria appear normal.     Mitral Valve  The mitral valve is normal. Mild mitral insufficiency is present.     Aortic Valve  Trace paravalvular AI. The mean gradient across the aortic valve is 14 mmHg.     Tricuspid Valve  The tricuspid valve is normal. Trace to mild tricuspid insufficiency is  present. The right ventricular systolic pressure is approximated at 32.7 mmHg  plus the right atrial pressure. Pulmonary artery systolic pressure is normal.     Pulmonic Valve  The pulmonic valve is normal. Trace pulmonic insufficiency is present.     Vessels  The thoracic aorta is normal. The pulmonary artery is normal. The inferior  vena cava is normal.     Pericardium  No pericardial effusion is present.     Compared to Previous Study  No significant changes noted.    Cardiac monitor 11/2023:  NSR   < 0.1% AF was found   1 episode of SVT   2 episodes NSVT  for 4 beats  No heart block       EKG 10/26/23 POD 1: NSR HR 98       Echocardiogram 10/26/23 POD 1:  Interpretation  Summary  TAVR with 23mm Guevara Resilia S3 on 10/25/23.  Valve is well seated with normal doppler assessment. PV 2.5m/s, MG 12mmHg; no  valvular or paravalvular AI.  Left ventricular function is normal.The ejection fraction is > 65%.  Global RV function appears normal on limited views.  The inferior vena cava is normal.  No pericardial effusion.     Echocardiogram (intra op): 10/25/2023:  Interpretation Summary  PROCEDURE  Intra-procedural transthoracic echocardiogram for transfemoral transcatheter  aortic valve replacement with 23MM TERELL 3 ULTRA. Image acquisition  by sonographer.     PRE-PROCEDURAL FINDINGS:  1. Severe calcific aortic stenosis.  2. Normal systolic function. Visual LVEF 55%.     POST-PROCEDURAL SURVEY:  1. 23MM TERELL 3 ULTRA Aortic Valve was successfully deployed.  2. Valve is well seated. There is trivial periprosthetic regurgitation.  3. Post-procedure valve hemodynamics - mean gradient 4.0 mmHg.  4. No pericardial effusion    NYHA Class: I     CLINICAL IMPRESSION:     Lacey Casarez is a very pleasant 78 year old female who presents for 1 month TAVR follow-up. She has a history of severe aortic valvular stenosis with NYHA class II heart failure symptoms that was treated with a transfemoral transcatheter aortic valve replacement (TAVR) with a 23mm Guevara Terell 3 Resilia on 10/25/23 by Clarice Heredia and Jaspreet.  The TAVR and post-procedural course were notable for syncope and orthostatic hypotension which resolved with IVF. Her EKG showed no conduction abnormalities, telemetry showed paroxysmal SVT. Her PTA BB was resumed. Her POD #1 echo showed normal TAVR function  with mean PG 12 mmHG without PVL. She was discharged on ASA 81 mg daily and cardiac monitor to evaluate for arrhythmias.     Severe aortic stenosis s/p TAVR 10/25/23:   Patient is doing well clinically.  She is active without cardiac symptoms.  Echocardiogram today shows normal TAVR function with mean pressure gradient  of 14 mmHg with trace paravalvular aortic insufficiency.  Her laboratory values are stable and EKG shows normal sinus rhythm.  Cardiac monitor shows no heart block or prolonged arrhythmias. Plan to continue aspirin 81 mg daily lifelong and SBE prophylaxis lifelong. Recommend cardiac rehab which she is scheduled to begin in the near future.  Repeat echocardiogram in 1 year.    2.  Mild CAD:  3.  Hyperlipidemia:  Pre TAVR cath with mild CAD. Currently no anginal symptoms.  Her cholesterol levels were well-controlled in September 2023 at [facility] with LDL of 80.  Continue aspirin 81 mg and atorvastatin 20 mg daily.      4.  Episode of dizziness:  5.  History of HTN with orthostatic hypotension:  6.  Paroxysmal SVT and NSVT:  Brief episode of dizziness and blurred vision about a week ago.  Cardiac monitor does not demonstrate any significant heart block or prolonged arrhythmias. She was noted to have short episode of SVT and 2 brief episodes of NSVT longest lasting 4 beats. Unlikely the cause of her symptoms given short duration. She does have a history of significant orthostatic hypotension in the hospital post TAVR. Orthostatics are mildly positive today with -->151 but she remains quite hypertensive. Plan to add low dose amlodipine 2.5 mg to her regimen and recommend adequate hydration. Continue other BP medications including lisinopril 40 mg, hydrochlorothiazide 25 mg daily, metoprolol  mg daily. Will call patient in 1 month to review BP log.     RTC: 6 months with HONEY Multani, CNP  [facility] Cardiology Team    CC  Patient Care Team:  Tiff Sparrow MD as PCP - General (Family Medicine)  Joni Duarte MD as Assigned Heart and Vascular Provider

## 2023-11-27 ENCOUNTER — ANCILLARY PROCEDURE (OUTPATIENT)
Dept: CARDIOLOGY | Facility: CLINIC | Age: 78
End: 2023-11-27
Attending: NURSE PRACTITIONER
Payer: COMMERCIAL

## 2023-11-27 ENCOUNTER — LAB (OUTPATIENT)
Dept: LAB | Facility: CLINIC | Age: 78
End: 2023-11-27
Attending: NURSE PRACTITIONER
Payer: COMMERCIAL

## 2023-11-27 VITALS
BODY MASS INDEX: 28.7 KG/M2 | WEIGHT: 156.9 LBS | SYSTOLIC BLOOD PRESSURE: 177 MMHG | OXYGEN SATURATION: 100 % | HEART RATE: 67 BPM | DIASTOLIC BLOOD PRESSURE: 81 MMHG

## 2023-11-27 DIAGNOSIS — Z95.2 S/P TAVR (TRANSCATHETER AORTIC VALVE REPLACEMENT): Primary | ICD-10-CM

## 2023-11-27 DIAGNOSIS — Z95.2 S/P TAVR (TRANSCATHETER AORTIC VALVE REPLACEMENT): ICD-10-CM

## 2023-11-27 DIAGNOSIS — I95.1 ORTHOSTATIC HYPOTENSION: ICD-10-CM

## 2023-11-27 DIAGNOSIS — I10 BENIGN ESSENTIAL HYPERTENSION: ICD-10-CM

## 2023-11-27 LAB
ANION GAP SERPL CALCULATED.3IONS-SCNC: 9 MMOL/L (ref 7–15)
BUN SERPL-MCNC: 17.4 MG/DL (ref 8–23)
CALCIUM SERPL-MCNC: 9.5 MG/DL (ref 8.8–10.2)
CHLORIDE SERPL-SCNC: 102 MMOL/L (ref 98–107)
CREAT SERPL-MCNC: 0.63 MG/DL (ref 0.51–0.95)
DEPRECATED HCO3 PLAS-SCNC: 30 MMOL/L (ref 22–29)
EGFRCR SERPLBLD CKD-EPI 2021: 90 ML/MIN/1.73M2
ERYTHROCYTE [DISTWIDTH] IN BLOOD BY AUTOMATED COUNT: 12.5 % (ref 10–15)
GLUCOSE SERPL-MCNC: 110 MG/DL (ref 70–99)
HCT VFR BLD AUTO: 37.8 % (ref 35–47)
HGB BLD-MCNC: 12.3 G/DL (ref 11.7–15.7)
LVEF ECHO: NORMAL
MCH RBC QN AUTO: 30.5 PG (ref 26.5–33)
MCHC RBC AUTO-ENTMCNC: 32.5 G/DL (ref 31.5–36.5)
MCV RBC AUTO: 94 FL (ref 78–100)
PLATELET # BLD AUTO: 117 10E3/UL (ref 150–450)
POTASSIUM SERPL-SCNC: 4 MMOL/L (ref 3.4–5.3)
RBC # BLD AUTO: 4.03 10E6/UL (ref 3.8–5.2)
SODIUM SERPL-SCNC: 141 MMOL/L (ref 135–145)
WBC # BLD AUTO: 5.6 10E3/UL (ref 4–11)

## 2023-11-27 PROCEDURE — 80048 BASIC METABOLIC PNL TOTAL CA: CPT | Performed by: PATHOLOGY

## 2023-11-27 PROCEDURE — 85027 COMPLETE CBC AUTOMATED: CPT | Performed by: PATHOLOGY

## 2023-11-27 PROCEDURE — 99214 OFFICE O/P EST MOD 30 MIN: CPT | Mod: 25 | Performed by: NURSE PRACTITIONER

## 2023-11-27 PROCEDURE — G0463 HOSPITAL OUTPT CLINIC VISIT: HCPCS | Performed by: NURSE PRACTITIONER

## 2023-11-27 PROCEDURE — 93005 ELECTROCARDIOGRAM TRACING: CPT

## 2023-11-27 PROCEDURE — 36415 COLL VENOUS BLD VENIPUNCTURE: CPT | Performed by: PATHOLOGY

## 2023-11-27 PROCEDURE — 93306 TTE W/DOPPLER COMPLETE: CPT | Performed by: INTERNAL MEDICINE

## 2023-11-27 PROCEDURE — 93010 ELECTROCARDIOGRAM REPORT: CPT | Performed by: INTERNAL MEDICINE

## 2023-11-27 RX ORDER — AMLODIPINE BESYLATE 2.5 MG/1
2.5 TABLET ORAL AT BEDTIME
Qty: 90 TABLET | Refills: 3 | Status: SHIPPED | OUTPATIENT
Start: 2023-11-27

## 2023-11-27 ASSESSMENT — PAIN SCALES - GENERAL: PAINLEVEL: NO PAIN (0)

## 2023-11-27 NOTE — LETTER
11/27/2023      RE: Lacey Casarez  [address]  [place] [address]       Dear Colleague,    Thank you for the opportunity to participate in the care of your patient, Lacey Casarez, at the [facility] HEART CLINIC [place] [facility]. Please see a copy of my visit note below.        [facility] HEART CARE  CARDIOVASCULAR DIVISION    VALVE CLINIC RETURN VISIT    PRIMARY CARDIOLOGIST: [facility]       PERTINENT CLINICAL HISTORY:     Lacey Casarez is a very pleasant 78 year old female who presents for 1 month TAVR follow-up. She has a history of severe aortic valvular stenosis with NYHA class II heart failure symptoms that was treated with a transfemoral transcatheter aortic valve replacement (TAVR) with a 23mm Guevara Terell 3 Resilia on 10/25/23 by Clarice Heredia and Jaspreet.  The TAVR and post-procedural course were notable for syncope and orthostatic hypotension which resolved with IVF. Her EKG showed no conduction abnormalities, telemetry showed paroxysmal SVT. Her PTA metoprolol was resumed. Her POD #1 echo showed normal TAVR function with mean PG 12 mmHG without PVL. She was discharged on ASA 81 mg daily and cardiac monitor to evaluate for arrhythmias.     She has been doing well. She has been walking 15-20 minutes daily around the house. She has no chest pain, shortness of breath, lightheadedness, palpitations, syncope. Last Friday she had an episode of blurred vision and dizziness after she was standing cooking for a long period of time. She had to sit back in her chair, symptoms resolved within a minute. Otherwise no other episodes of dizziness/lightheadedness. No neurology symptoms. Her groin sites are fully healed. She will be starting cardiac rehab in the near future. She does not check home blood pressure. She is consistently high in clinic. Notes she has not taken BP medications this morning.      PAST MEDICAL HISTORY:    HTN  HLD  Pre diabetes  Paroxysmal SVT  Severe aortic stenosis s/p TAVR  Chronic diastolic heart failure, resolved post TAVR      PAST SURGICAL HISTORY:     Past Surgical History:   Procedure Laterality Date     CV CORONARY ANGIOGRAM N/A 10/9/2023    Procedure: Coronary Angiogram;  Surgeon: Nick Lagos MD;  Location:  HEART CARDIAC CATH LAB     CV RIGHT HEART CATH MEASUREMENTS RECORDED N/A 10/9/2023    Procedure: Right Heart Catheterization;  Surgeon: Nick Lagos MD;  Location:  HEART CARDIAC CATH LAB     CV TRANSCATHETER AORTIC VALVE REPLACEMENT-FEMORAL APPROACH N/A 10/25/2023    Procedure: Transcatheter aortic valve replacement with any indicated procedure- Guevara.;  Surgeon: Jarocho Heredia MD;  Location:  OR     OR TRANSCATHETER AORTIC VALVE REPLACEMENT, FEMORAL PERCUTANEOUS APPROACH (STANDBY) N/A 10/25/2023    Procedure: OR TRANSCATHETER AORTIC VALVE REPLACEMENT, OPEN FEMORAL ARTERY APPROACH- Guevara;  Surgeon: Dinh Rodriguez MD;  Location:  OR      CURRENT MEDICATIONS:     Current Outpatient Medications   Medication Sig Dispense Refill     acetaminophen (TYLENOL) 500 MG tablet Take 500-1,000 mg by mouth every 6 hours as needed for mild pain or fever       albuterol (PROAIR HFA/PROVENTIL HFA/VENTOLIN HFA) 108 (90 Base) MCG/ACT inhaler Inhale 1-2 Puffs every 6 hours as needed for Wheezing.       aspirin 81 MG EC tablet Take 1 tablet (81 mg) by mouth daily 90 tablet 3     atorvastatin (LIPITOR) 20 MG tablet Take 20 mg by mouth daily       blood glucose (ONETOUCH VERIO IQ) test strip TEST BLOOD SUGAR ONCE WEEKLY.       fluticasone (FLOVENT HFA) 44 MCG/ACT inhaler Inhale 2 puffs into the lungs 2 times daily       hydrochlorothiazide (HYDRODIURIL) 25 MG tablet Take 1 tablet (25 mg) by mouth daily       lisinopril (ZESTRIL) 40 MG tablet Take 40 mg by mouth daily       metoprolol succinate ER (TOPROL XL) 25 MG 24 hr tablet Take 4 tablets (100 mg) by mouth  daily          ALLERGIES:   No Known Allergies     FAMILY HISTORY:   No family history on file.     SOCIAL HISTORY:     Social History     Socioeconomic History     Marital status:    Tobacco Use     Smoking status: Never     Smokeless tobacco: Never      REVIEW OF SYSTEMS:     Constitutional: No fevers or chills  Skin: No new rash or itching  Eyes: No acute change in vision  Ears/Nose/Throat: No purulent rhinorrhea, new hearing loss, or new vertigo  Respiratory: No cough or hemoptysis  Cardiovascular: See HPI  Gastrointestinal: No change in appetite, vomiting, hematemesis or diarrhea  Genitourinary: No dysuria or hematuria  Musculoskeletal: No new back pain, neck pain or muscle pain  Neurologic: No new headaches, focal weakness or behavior changes  Psychiatric: No hallucinations, excessive alcohol consumption or illegal drug usage  Hematologic/Lymphatic/Immunologic: No bleeding, chills, fever, night sweats or weight loss  Endocrine: No new cold intolerance, heat intolerance, polyphagia, polydipsia or polyuria      PHYSICAL EXAMINATION:   BP (!) 177/81 (BP Location: Right arm, Patient Position: Chair, Cuff Size: Adult Regular)   Pulse 67   Wt 71.2 kg (156 lb 14.4 oz)   SpO2 100%   BMI 28.70 kg/m      Orthostatic Vitals from 11/25/23 1216 to 11/27/23 1216    Date and Time Orthostatic BP Orthostatic Pulse Patient Position BP   Location Cuff Size   11/27/23 1103 151/75 66 Chair Right arm Adult Regular   11/27/23 1102 167/76 65 Chair Right arm Adult Regular   11/27/23 1101 171/78 65 Chair Right arm Adult Regular   11/27/23 1024 -- -- Chair Right arm Adult Regular     General: NAD  CV: Normal S1S2  Resp: Clear LS  Extremities: Normal pulses  Neuro: Normal     LABORATORY DATA:     CBC RESULTS:  Lab Results   Component Value Date    WBC 6.9 10/27/2023    RBC 3.09 (L) 10/27/2023    HGB 9.5 (L) 10/27/2023    HCT 29.3 (L) 10/27/2023    MCV 95 10/27/2023    MCH 30.7 10/27/2023    MCHC 32.4 10/27/2023    RDW 13.0  10/27/2023    PLT 66 (L) 10/27/2023       BMP RESULTS:  Lab Results   Component Value Date     10/27/2023    POTASSIUM 3.8 10/27/2023    CHLORIDE 100 10/27/2023    CO2 26 10/27/2023    ANIONGAP 10 10/27/2023     (H) 10/27/2023     (H) 10/26/2023    BUN 36.8 (H) 10/27/2023    CR 1.11 (H) 10/27/2023    GFRESTIMATED 50 (L) 10/27/2023    BUSHRA 8.6 (L) 10/27/2023         PROCEDURES & FURTHER ASSESSMENTS:       EKG 11/27/23:  NSR HR 67, normal QRSd 82 ms     ECHO 11/27/23:  Interpretation Summary  S/P TAVR with 23mm Guevara Resilia S3 on 10/25/23.  The mean gradient across the aortic valve is 14 mmHg.  Trace paravalvular AI.  No significant changes noted.  ______________________________________________________________________________  Left Ventricle  S/P TAVR with 23mm Guevara Resilia S3 on 10/25/23. Global and regional left  ventricular function is normal with an EF of 60-65%. Left ventricular wall  thickness is normal. Left ventricular size is normal. Left ventricular  diastolic function is indeterminate. Diastolic Doppler findings (E/E' ratio  and/or other parameters) suggest left ventricular filling pressures are  increased. No regional wall motion abnormalities are seen.     Right Ventricle  Right ventricular function, chamber size, wall motion, and thickness are  normal.     Atria  Both atria appear normal.     Mitral Valve  The mitral valve is normal. Mild mitral insufficiency is present.     Aortic Valve  Trace paravalvular AI. The mean gradient across the aortic valve is 14 mmHg.     Tricuspid Valve  The tricuspid valve is normal. Trace to mild tricuspid insufficiency is  present. The right ventricular systolic pressure is approximated at 32.7 mmHg  plus the right atrial pressure. Pulmonary artery systolic pressure is normal.     Pulmonic Valve  The pulmonic valve is normal. Trace pulmonic insufficiency is present.     Vessels  The thoracic aorta is normal. The pulmonary artery is normal. The  inferior  vena cava is normal.     Pericardium  No pericardial effusion is present.     Compared to Previous Study  No significant changes noted.    Cardiac monitor 11/2023:  NSR   < 0.1% AF was found   1 episode of SVT   2 episodes NSVT  for 4 beats  No heart block       EKG 10/26/23 POD 1: NSR HR 98       Echocardiogram 10/26/23 POD 1:  Interpretation Summary  TAVR with 23mm Guevara Resilia S3 on 10/25/23.  Valve is well seated with normal doppler assessment. PV 2.5m/s, MG 12mmHg; no  valvular or paravalvular AI.  Left ventricular function is normal.The ejection fraction is > 65%.  Global RV function appears normal on limited views.  The inferior vena cava is normal.  No pericardial effusion.     Echocardiogram (intra op): 10/25/2023:  Interpretation Summary  PROCEDURE  Intra-procedural transthoracic echocardiogram for transfemoral transcatheter  aortic valve replacement with 23MM TERELL 3 ULTRA. Image acquisition  by sonographer.     PRE-PROCEDURAL FINDINGS:  1. Severe calcific aortic stenosis.  2. Normal systolic function. Visual LVEF 55%.     POST-PROCEDURAL SURVEY:  1. 23MM TERELL 3 ULTRA Aortic Valve was successfully deployed.  2. Valve is well seated. There is trivial periprosthetic regurgitation.  3. Post-procedure valve hemodynamics - mean gradient 4.0 mmHg.  4. No pericardial effusion    NYHA Class: I     CLINICAL IMPRESSION:     Lacey Casarez is a very pleasant 78 year old female who presents for 1 month TAVR follow-up. She has a history of severe aortic valvular stenosis with NYHA class II heart failure symptoms that was treated with a transfemoral transcatheter aortic valve replacement (TAVR) with a 23mm Guevara Terell 3 Resilia on 10/25/23 by Clarice Heredia and Jaspreet.  The TAVR and post-procedural course were notable for syncope and orthostatic hypotension which resolved with IVF. Her EKG showed no conduction abnormalities, telemetry showed paroxysmal SVT. Her PTA BB was resumed. Her  POD #1 echo showed normal TAVR function  with mean PG 12 mmHG without PVL. She was discharged on ASA 81 mg daily and cardiac monitor to evaluate for arrhythmias.     Severe aortic stenosis s/p TAVR 10/25/23:   Patient is doing well clinically.  She is active without cardiac symptoms.  Echocardiogram today shows normal TAVR function with mean pressure gradient of 14 mmHg with trace paravalvular aortic insufficiency.  Her laboratory values are stable and EKG shows normal sinus rhythm.  Cardiac monitor shows no heart block or prolonged arrhythmias. Plan to continue aspirin 81 mg daily lifelong and SBE prophylaxis lifelong. Recommend cardiac rehab which she is scheduled to begin in the near future.  Repeat echocardiogram in 1 year.    2.  Mild CAD:  3.  Hyperlipidemia:  Pre TAVR cath with mild CAD. Currently no anginal symptoms.  Her cholesterol levels were well-controlled in September 2023 at [facility] with LDL of 80.  Continue aspirin 81 mg and atorvastatin 20 mg daily.      4.  Episode of dizziness:  5.  History of HTN with orthostatic hypotension:  6.  Paroxysmal SVT and NSVT:  Brief episode of dizziness and blurred vision about a week ago.  Cardiac monitor does not demonstrate any significant heart block or prolonged arrhythmias. She was noted to have short episode of SVT and 2 brief episodes of NSVT longest lasting 4 beats. Unlikely the cause of her symptoms given short duration. She does have a history of significant orthostatic hypotension in the hospital post TAVR. Orthostatics are mildly positive today with -->151 but she remains quite hypertensive. Plan to add low dose amlodipine 2.5 mg to her regimen and recommend adequate hydration. Continue other BP medications including lisinopril 40 mg, hydrochlorothiazide 25 mg daily, metoprolol  mg daily. Will call patient in 1 month to review BP log.     RTC: 6 months with HONEY Multani, CNP  [facility] Cardiology Team    CC  Patient Care  Team:  Tiff Sparorw MD as PCP - General (Family Medicine)  Joni Duarte MD as Assigned Heart and Vascular Provider          Please do not hesitate to contact me if you have any questions/concerns.     Sincerely,     Dee Lopez NP

## 2023-11-27 NOTE — NURSING NOTE
Chief Complaint   Patient presents with    Follow Up     23-75 day s/p TAVR follow up       Vitals were taken, medications reconciled, and EKG was performed.    Emani Rivas EMT  10:35 AM

## 2023-11-27 NOTE — PATIENT INSTRUCTIONS
You were seen today in the Structural Heart Clinic at the [facility].    Cardiology provider you saw during your visit: Dee Lopez NP    Your ECHO, labs and EKG all look good! Make sure you are staying hydrated to avoid further episodes of dizziness.     Instructions:   Continue aspirin 81 mg daily lifelong.  Your BP is high today and your BP does not drop with standing, add low dose amlodipine to be taken at bedtime  Start checking home BP daily - 1 hour after morning medications, we will call you in a month to review   Delay any nonurgent dental procedures for the first 6 month post TAVR.  For all future dental cleanings and procedures you will need to take antibiotics prior - see instructions below.   Start cardiac rehab [place]   Follow-up in Valve Clinic in 6 months     Prevention of Infective (Bacterial) Endocarditis:  You are at increased risk for developing adverse outcomes from infective endocarditis (IE), also known as bacterial endocarditis (BE) because of the new device in your heart. The guidelines for prevention of IE are to give patients antibiotics prior to any dental procedures that involve manipulation of gingival tissue or the periapical region of teeth, or perforation of the oral mucosa:      It is recommended to take Amoxicillin 2 gm by mouth as a single dose 30 to 60 minutes before procedure.     OR if allergic to Penicillin or Ampicillin:     Cephalexin 2 gm by mouth, or  Clindamycin 600 mg by mouth, or  Azithromycin or Clarithromycin 500 mg PO       Questions and scheduling:   First call: Structural Heart  Macie Gonzalez [phone]    General scheduling line: [phone].   First press #1 for the University and then press #3 for Medical Questions to reach the Cardiology triage nurse.     On Call Cardiologist for after hours or on weekends: [phone], press option #4 and ask to speak to the on-call Cardiologist.

## 2023-11-28 LAB
ATRIAL RATE - MUSE: 67 BPM
DIASTOLIC BLOOD PRESSURE - MUSE: NORMAL MMHG
INTERPRETATION ECG - MUSE: NORMAL
P AXIS - MUSE: 53 DEGREES
PR INTERVAL - MUSE: 152 MS
QRS DURATION - MUSE: 80 MS
QT - MUSE: 400 MS
QTC - MUSE: 422 MS
R AXIS - MUSE: 25 DEGREES
SYSTOLIC BLOOD PRESSURE - MUSE: NORMAL MMHG
T AXIS - MUSE: 19 DEGREES
VENTRICULAR RATE- MUSE: 67 BPM

## 2023-12-05 ENCOUNTER — CARE COORDINATION (OUTPATIENT)
Dept: CARDIOLOGY | Facility: CLINIC | Age: 78
End: 2023-12-05
Payer: COMMERCIAL

## 2023-12-05 NOTE — PROGRESS NOTES
"Wily was called as a follow-up to her starting a new medication. She had been instructed to take her BP's for a week and then to record them.     She stated:  My blood pressure is showing a \"big difference\".  She said she took her BP's on 12/2 and they were as follows:  12/2: 159/65, 127/72, 135/61    She then stated that she had purchased the medication amlodipine, but has not started it.  She said she read the side effects of the medication and \"was scared to start it.  But I promise, I had planned to take and start the medication tonight (Tuesday, 12/5).  What do I do if I have these side effects; call 911?\"    I told her to take the medication for a couple of days, and then to call us if she is having side effects.    She verbalized understanding to this.         "

## 2023-12-12 ENCOUNTER — CARE COORDINATION (OUTPATIENT)
Dept: CARDIOLOGY | Facility: CLINIC | Age: 78
End: 2023-12-12
Payer: COMMERCIAL

## 2023-12-12 NOTE — PROGRESS NOTES
Return phone call made to Wily.  She stated that currently, she is having no symptoms and fells well.  She stated that over the past 5 days she has two episodes of mild pain on her left chest area.  She stated on episode lasted three minutes and the other episode lasted 5 minutes.  She was wondering if this was serious.  She stated she had no other symptoms happening when this happened.  Angiogram results from October 2023 showed mild non-obstructive CAD. Wily was reassured this was unlikely related to her heart.  I explained if the symptoms happened much more frequently, of the intensity or duration increased to call us.  I also explained there are many causes for chest pain, and many of these are not cardiac.

## 2024-01-22 ENCOUNTER — TELEPHONE (OUTPATIENT)
Dept: CARDIOLOGY | Facility: CLINIC | Age: 79
End: 2024-01-22
Payer: COMMERCIAL

## 2024-01-22 ENCOUNTER — TELEPHONE (OUTPATIENT)
Dept: CARDIOLOGY | Facility: CLINIC | Age: 79
End: 2024-01-22

## 2024-01-22 DIAGNOSIS — I35.0 AORTIC STENOSIS, SEVERE: ICD-10-CM

## 2024-01-22 DIAGNOSIS — Z95.2 S/P TAVR (TRANSCATHETER AORTIC VALVE REPLACEMENT): Primary | ICD-10-CM

## 2024-01-22 RX ORDER — ASPIRIN 81 MG/1
81 TABLET ORAL DAILY
Qty: 90 TABLET | Refills: 3 | Status: SHIPPED | OUTPATIENT
Start: 2024-01-22

## 2024-01-22 NOTE — TELEPHONE ENCOUNTER
[facility] Call Center    Phone Message    May a detailed message be left on voicemail: yes     Reason for Call: Medication Refill Request    Has the patient contacted the pharmacy for the refill? Yes   Name of medication being requested: aspirin 81 MG EC tablet   Provider who prescribed the medication: Brady  Pharmacy: [facility] [id number] [place] [facility] [address]    Date medication is needed: 1/22/24    Action Taken: Message routed to:  Other: Cardiology    Travel Screening: Not Applicable    Thank you!  [facility]

## 2024-01-22 NOTE — TELEPHONE ENCOUNTER
Patient calling to speak to the nurses about her post op meds. Specifically aspirin.     Anna Bailey  Periop Electrophysiology   [phone]

## 2024-02-13 NOTE — NURSING NOTE
[place] Cardiomyopathy Questionnaire  (KCCQ-12)  Date: 10/5/2023    Pre TAVR      1.  A.  5      B.  3       C.  1  2.  3  3.  2  4.  3  5.  4  6.  2  7.  2  8.  A. 3     B.  2     C.  4       5 Meter/15 foot Walk test  Trial 1  5  Trial 2   6  Trial 3  6      Provided additional education regarding TAVR/MitraClip procedure, after being present for discussion with physician. Explained the work-up process and next steps for patient. Patient provided our direct contact number and instructed to call with any questions.

## 2024-02-16 NOTE — NURSING NOTE
[place] Cardiomyopathy Questionnaire  (CQ-12)  Date: 11/27/2023    30-day post-TAVR    1.  A.  5      B.  5       C.  5  2.  5  3.  7  4.  7  5.  5  6.  5  7.  5  8.  A. 5     B.  5     C.  5

## 2024-03-24 NOTE — PROGRESS NOTES
[facility]  CARDIOVASCULAR DIVISION    VALVE CLINIC RETURN VISIT    PRIMARY CARDIOLOGIST: [facility]       PERTINENT CLINICAL HISTORY:     Lacey Casarez is a very pleasant 78 year old female who presents for 6 month TAVR follow-up. She has a history of severe aortic valvular stenosis with NYHA class II heart failure symptoms that was treated with a transfemoral transcatheter aortic valve replacement (TAVR) with a 23mm Guevara Terell 3 Resilia on 10/25/23 by Clarice Heredia and Jaspreet.  The TAVR and post-procedural course were notable for syncope and orthostatic hypotension which resolved with IVF. Her EKG showed no conduction abnormalities, telemetry showed paroxysmal SVT. Her PTA metoprolol was resumed. Her POD #1 echo showed normal TAVR function with mean PG 12 mmHG without PVL. She was discharged on ASA 81 mg daily and cardiac monitor which showed no high degree AVB.     She has been doing very well. She has been performing ADLs including cooking, cleaning, laundry which in involves going up and down the stairs. She goes to the store or the mall three times a week where she walks around for 20-30 minutes. She goes to [employer] every Sunday. She has no chest pain, shortness of breath, lightheadedness, palpitations, syncope. Her blood pressure is well controlled at home now on low dose amlodipine, losartan, hydrochlorothiazide, metoprolol. Overall no concerns today.      PAST MEDICAL HISTORY:   HTN  HLD  Pre diabetes  Paroxysmal SVT  Severe aortic stenosis s/p TAVR  Chronic diastolic heart failure, resolved post TAVR      PAST SURGICAL HISTORY:     Past Surgical History:   Procedure Laterality Date    CV CORONARY ANGIOGRAM N/A 10/9/2023    Procedure: Coronary Angiogram;  Surgeon: Nick Lagos MD;  Location:  HEART CARDIAC CATH LAB    CV RIGHT HEART CATH MEASUREMENTS RECORDED N/A 10/9/2023    Procedure: Right Heart Catheterization;  Surgeon: Nick Lagos  MD;  Location:  HEART CARDIAC CATH LAB    CV TRANSCATHETER AORTIC VALVE REPLACEMENT-FEMORAL APPROACH N/A 10/25/2023    Procedure: Transcatheter aortic valve replacement with any indicated procedure- Guevara.;  Surgeon: Jarocho Heredia MD;  Location:  OR    OR TRANSCATHETER AORTIC VALVE REPLACEMENT, FEMORAL PERCUTANEOUS APPROACH (STANDBY) N/A 10/25/2023    Procedure: OR TRANSCATHETER AORTIC VALVE REPLACEMENT, OPEN FEMORAL ARTERY APPROACH- Guevara;  Surgeon: Dinh Rodriguez MD;  Location:  OR      CURRENT MEDICATIONS:     Current Outpatient Medications   Medication Sig Dispense Refill    acetaminophen (TYLENOL) 500 MG tablet Take 500-1,000 mg by mouth every 6 hours as needed for mild pain or fever      albuterol (PROAIR HFA/PROVENTIL HFA/VENTOLIN HFA) 108 (90 Base) MCG/ACT inhaler Inhale 1-2 Puffs every 6 hours as needed for Wheezing.      amLODIPine (NORVASC) 2.5 MG tablet Take 1 tablet (2.5 mg) by mouth at bedtime 90 tablet 3    aspirin 81 MG EC tablet Take 1 tablet (81 mg) by mouth daily 90 tablet 3    atorvastatin (LIPITOR) 20 MG tablet Take 20 mg by mouth daily      blood glucose (ONETOUCH VERIO IQ) test strip TEST BLOOD SUGAR ONCE WEEKLY. (Patient not taking: Reported on 11/27/2023)      fluticasone (FLOVENT HFA) 44 MCG/ACT inhaler Inhale 2 puffs into the lungs 2 times daily      hydrochlorothiazide (HYDRODIURIL) 25 MG tablet Take 1 tablet (25 mg) by mouth daily      lisinopril (ZESTRIL) 40 MG tablet Take 40 mg by mouth daily      metoprolol succinate ER (TOPROL XL) 25 MG 24 hr tablet Take 4 tablets (100 mg) by mouth daily          ALLERGIES:   No Known Allergies     FAMILY HISTORY:   No family history on file.     SOCIAL HISTORY:     Social History     Socioeconomic History    Marital status:    Tobacco Use    Smoking status: Never    Smokeless tobacco: Never      REVIEW OF SYSTEMS:     Constitutional: No fevers or chills  Skin: No new rash or itching  Eyes: No acute change in  vision  Ears/Nose/Throat: No purulent rhinorrhea, new hearing loss, or new vertigo  Respiratory: No cough or hemoptysis  Cardiovascular: See HPI  Gastrointestinal: No change in appetite, vomiting, hematemesis or diarrhea  Genitourinary: No dysuria or hematuria  Musculoskeletal: No new back pain, neck pain or muscle pain  Neurologic: No new headaches, focal weakness or behavior changes  Psychiatric: No hallucinations, excessive alcohol consumption or illegal drug usage  Hematologic/Lymphatic/Immunologic: No bleeding, chills, fever, night sweats or weight loss  Endocrine: No new cold intolerance, heat intolerance, polyphagia, polydipsia or polyuria      PHYSICAL EXAMINATION:   BP (!) 156/69 (BP Location: Right arm, Patient Position: Chair, Cuff Size: Adult Regular)   Pulse 64   Wt 73.6 kg (162 lb 3.2 oz)   SpO2 100%   BMI 29.67 kg/m      HOME BP LOG  138/57 /61. 131/53/56. 130/54/56. 135/59/60. 134/54/58. 135/62/56. 133/66/61. 128/51/60. 132/51/61. 138/66/59. 131/53/53. 131/51/54. 130/57/56. 127/53/55. 120/51/55. 134/61/64. 119/52/61. 123/55/60     General: NAD  CV: Normal S1 S2, soft systolic murmur RUSB, much improved   Resp: Clear LS  Extremities: Normal pulses  Neuro: Normal     LABORATORY DATA:     CBC RESULTS:  Lab Results   Component Value Date    WBC 5.6 11/27/2023    RBC 4.03 11/27/2023    HGB 12.3 11/27/2023    HCT 37.8 11/27/2023    MCV 94 11/27/2023    MCH 30.5 11/27/2023    MCHC 32.5 11/27/2023    RDW 12.5 11/27/2023     (L) 11/27/2023       BMP RESULTS:  Lab Results   Component Value Date     11/27/2023    POTASSIUM 4.0 11/27/2023    CHLORIDE 102 11/27/2023    CO2 30 (H) 11/27/2023    ANIONGAP 9 11/27/2023     (H) 11/27/2023     (H) 10/26/2023    BUN 17.4 11/27/2023    CR 0.63 11/27/2023    GFRESTIMATED 90 11/27/2023    BUSHRA 9.5 11/27/2023         PROCEDURES & FURTHER ASSESSMENTS:   EKG 3/25/24:      EKG 11/27/23:  NSR HR 67, normal QRSd 82 ms     ECHO  11/27/23:  Interpretation Summary  S/P TAVR with 23mm Guevara Resilia S3 on 10/25/23.  The mean gradient across the aortic valve is 14 mmHg.  Trace paravalvular AI.  No significant changes noted.  ______________________________________________________________________________  Left Ventricle  S/P TAVR with 23mm Guevara Resilia S3 on 10/25/23. Global and regional left  ventricular function is normal with an EF of 60-65%. Left ventricular wall  thickness is normal. Left ventricular size is normal. Left ventricular  diastolic function is indeterminate. Diastolic Doppler findings (E/E' ratio  and/or other parameters) suggest left ventricular filling pressures are  increased. No regional wall motion abnormalities are seen.     Right Ventricle  Right ventricular function, chamber size, wall motion, and thickness are  normal.     Atria  Both atria appear normal.     Mitral Valve  The mitral valve is normal. Mild mitral insufficiency is present.     Aortic Valve  Trace paravalvular AI. The mean gradient across the aortic valve is 14 mmHg.     Tricuspid Valve  The tricuspid valve is normal. Trace to mild tricuspid insufficiency is  present. The right ventricular systolic pressure is approximated at 32.7 mmHg  plus the right atrial pressure. Pulmonary artery systolic pressure is normal.     Pulmonic Valve  The pulmonic valve is normal. Trace pulmonic insufficiency is present.     Vessels  The thoracic aorta is normal. The pulmonary artery is normal. The inferior  vena cava is normal.     Pericardium  No pericardial effusion is present.     Compared to Previous Study  No significant changes noted.    Cardiac monitor 11/2023:  NSR   < 0.1% AF was found   1 episode of SVT   2 episodes NSVT  for 4 beats  No heart block       EKG 10/26/23 POD 1: NSR HR 98       Echocardiogram 10/26/23 POD 1:  Interpretation Summary  TAVR with 23mm Guevara Resilia S3 on 10/25/23.  Valve is well seated with normal doppler assessment. PV 2.5m/s,  MG 12mmHg; no  valvular or paravalvular AI.  Left ventricular function is normal.The ejection fraction is > 65%.  Global RV function appears normal on limited views.  The inferior vena cava is normal.  No pericardial effusion.     Echocardiogram (intra op): 10/25/2023:  Interpretation Summary  PROCEDURE  Intra-procedural transthoracic echocardiogram for transfemoral transcatheter  aortic valve replacement with 23MM TERELL 3 ULTRA. Image acquisition  by sonographer.     PRE-PROCEDURAL FINDINGS:  1. Severe calcific aortic stenosis.  2. Normal systolic function. Visual LVEF 55%.     POST-PROCEDURAL SURVEY:  1. 23MM TERELL 3 ULTRA Aortic Valve was successfully deployed.  2. Valve is well seated. There is trivial periprosthetic regurgitation.  3. Post-procedure valve hemodynamics - mean gradient 4.0 mmHg.  4. No pericardial effusion    NYHA Class: I     CLINICAL IMPRESSION:     Lacey Casarez is a very pleasant 78 year old female who presents for 6 month TAVR follow-up. She has a history of severe aortic valvular stenosis with NYHA class II heart failure symptoms that was treated with a transfemoral transcatheter aortic valve replacement (TAVR) with a 23mm Guevara Terell 3 Resilia on 10/25/23 by Clarice Heredia and Jaspreet.  The TAVR and post-procedural course were notable for syncope and orthostatic hypotension which resolved with IVF. Her EKG showed no conduction abnormalities, telemetry showed paroxysmal SVT. Her PTA BB was resumed. Her POD #1 echo showed normal TAVR function  with mean PG 12 mmHG without PVL. She was discharged on ASA 81 mg daily and cardiac monitor which showed no high degree AVB.    Severe aortic stenosis s/p TAVR 10/25/23:   Chronic HFpEF, resolved post TAVR:  Patient is doing well clinically.  She is active without cardiac symptoms.  Last ECHO 11/2023 TAVR function with mean pressure gradient of 14 mmHg with trace paravalvular aortic insufficiency. EKG today shows NSR. Plan to continue  aspirin 81 mg daily lifelong and SBE prophylaxis lifelong.  Repeat echocardiogram in 6 months.     3.  Mild CAD:  4.  Hyperlipidemia, well controlled:  5.  Hypertension, well controlled:  Pre TAVR cath with mild CAD. Currently no anginal symptoms.  Her cholesterol levels were well-controlled in September 2023 at [facility] with LDL of 80. BP well controlled on current regimen. History of orthostatic hypotension post TAVR without recurrence. Continue aspirin 81 mg and atorvastatin 20 mg daily.  Continue current BP regimen with lisinopril 40 mg, hydrochlorothiazide 25 mg daily, amlodipine 2.5 HS.    6.  Paroxysmal SVT and NSVT:  Cardiac monitor post TAVR showed no high degree AVB but notable for 3 brief runs of SVT and NSVT. EKG today WNL HR 65.   - Continue metoprolol  mg daily     RTC: 6 months with me echo and labs prior       HONEY Preston, CNP  [facility] Cardiology Team    CC  Patient Care Team:  Tiff Sparrow MD as PCP - General (Family Medicine)  Dee Lopez NP as Assigned Heart and Vascular Provider

## 2024-03-25 ENCOUNTER — OFFICE VISIT (OUTPATIENT)
Dept: CARDIOLOGY | Facility: CLINIC | Age: 79
End: 2024-03-25
Attending: NURSE PRACTITIONER
Payer: COMMERCIAL

## 2024-03-25 VITALS
HEART RATE: 64 BPM | DIASTOLIC BLOOD PRESSURE: 69 MMHG | WEIGHT: 162.2 LBS | SYSTOLIC BLOOD PRESSURE: 156 MMHG | BODY MASS INDEX: 29.67 KG/M2 | OXYGEN SATURATION: 100 %

## 2024-03-25 DIAGNOSIS — Z95.2 S/P TAVR (TRANSCATHETER AORTIC VALVE REPLACEMENT): Primary | ICD-10-CM

## 2024-03-25 DIAGNOSIS — I10 BENIGN ESSENTIAL HYPERTENSION: ICD-10-CM

## 2024-03-25 DIAGNOSIS — I25.10 CORONARY ARTERY DISEASE INVOLVING NATIVE HEART, UNSPECIFIED VESSEL OR LESION TYPE, UNSPECIFIED WHETHER ANGINA PRESENT: ICD-10-CM

## 2024-03-25 PROCEDURE — 93010 ELECTROCARDIOGRAM REPORT: CPT | Performed by: INTERNAL MEDICINE

## 2024-03-25 PROCEDURE — G0463 HOSPITAL OUTPT CLINIC VISIT: HCPCS | Performed by: NURSE PRACTITIONER

## 2024-03-25 PROCEDURE — 99214 OFFICE O/P EST MOD 30 MIN: CPT | Performed by: NURSE PRACTITIONER

## 2024-03-25 PROCEDURE — 93005 ELECTROCARDIOGRAM TRACING: CPT

## 2024-03-25 ASSESSMENT — PAIN SCALES - GENERAL: PAINLEVEL: NO PAIN (0)

## 2024-03-25 NOTE — PATIENT INSTRUCTIONS
You were seen today in the Structural Heart Clinic at the [facility].    Cardiology provider you saw during your visit: Dee Lopez NP    Instructions:   Continue aspirin 81 mg daily lifelong.  Continue all blood pressure medications - your BP looks great!  Delay any nonurgent dental procedures for the first 6 month post TAVR.  For all future dental cleanings and procedures you will need to take antibiotics prior - see instructions below.   Continue daily exercise   You are safe to travel  Follow-up in Valve Clinic in 6 months with echo and labs prior     Prevention of Infective (Bacterial) Endocarditis:  You are at increased risk for developing adverse outcomes from infective endocarditis (IE), also known as bacterial endocarditis (BE) because of the new device in your heart. The guidelines for prevention of IE are to give patients antibiotics prior to any dental procedures that involve manipulation of gingival tissue or the periapical region of teeth, or perforation of the oral mucosa:      It is recommended to take Amoxicillin 2 gm by mouth as a single dose 30 to 60 minutes before procedure.     OR if allergic to Penicillin or Ampicillin:     Cephalexin 2 gm by mouth, or  Clindamycin 600 mg by mouth, or  Azithromycin or Clarithromycin 500 mg PO       Questions and scheduling:   First call: Structural Heart  Macie Gonzalez [phone]    General scheduling line: [phone].   First press #1 for the University and then press #3 for Medical Questions to reach the Cardiology triage nurse.     On Call Cardiologist for after hours or on weekends: [phone], press option #4 and ask to speak to the on-call Cardiologist.

## 2024-03-25 NOTE — PROGRESS NOTES
Optimal Vascular Metrics    Blood Pressure   BP < 140/90 No: well controlled at home     On Aspirin  Yes    On Statin  Yes    Tobacco use  No

## 2024-03-25 NOTE — PROGRESS NOTES
Structural Heart Coordinator visit:  Provided additional education regarding TAVR/MitraClip procedure, after being present for discussion with physician. Explained the work-up process and next steps for patient. Patient provided our direct contact number and instructed to call with any questions.       6 month  S/P TAVR      KCCQ Results:   1a. 3  1b. 3  1c. 2  2. 5  3. 7  4. 7  5. 5  6. 5  7. 5  8a. 5  8b. 5  8c. 5    Valerie Gonzalez CMA  Structural Heart   [facility]

## 2024-03-25 NOTE — NURSING NOTE
Chief Complaint   Patient presents with    Follow Up     Return TAVR- TAVR 6 month Follow up     Vitals were taken, medications reconciled, and EKG was performed.    Johnny Brewer, HUSSEIN  1:31 PM

## 2024-03-25 NOTE — LETTER
3/25/2024      RE: Lacey Casarez  [address]  [facility] [address]       Dear Colleague,    Thank you for the opportunity to participate in the care of your patient, Lacey Casarez, at the [facility] [place] at [facility]. Please see a copy of my visit note below.    [facility] HEART CARE  CARDIOVASCULAR DIVISION    VALVE CLINIC RETURN VISIT    PRIMARY CARDIOLOGIST: [facility]       PERTINENT CLINICAL HISTORY:     Lacey Casarez is a very pleasant 78 year old female who presents for 6 month TAVR follow-up. She has a history of severe aortic valvular stenosis with NYHA class II heart failure symptoms that was treated with a transfemoral transcatheter aortic valve replacement (TAVR) with a 23mm Guevara Terell 3 Resilia on 10/25/23 by Clarice Heredia and Jaspreet.  The TAVR and post-procedural course were notable for syncope and orthostatic hypotension which resolved with IVF. Her EKG showed no conduction abnormalities, telemetry showed paroxysmal SVT. Her PTA metoprolol was resumed. Her POD #1 echo showed normal TAVR function with mean PG 12 mmHG without PVL. She was discharged on ASA 81 mg daily and cardiac monitor which showed no high degree AVB.     She has been doing very well. She has been performing ADLs including cooking, cleaning, laundry which in involves going up and down the stairs. She goes to the store or the mall three times a week where she walks around for 20-30 minutes. She goes to [employer] every Sunday. She has no chest pain, shortness of breath, lightheadedness, palpitations, syncope. Her blood pressure is well controlled at home now on low dose amlodipine, losartan, hydrochlorothiazide, metoprolol. Overall no concerns today.      PAST MEDICAL HISTORY:   HTN  HLD  Pre diabetes  Paroxysmal SVT  Severe aortic stenosis s/p TAVR  Chronic diastolic heart failure, resolved post TAVR      PAST SURGICAL HISTORY:     Past  Surgical History:   Procedure Laterality Date    CV CORONARY ANGIOGRAM N/A 10/9/2023    Procedure: Coronary Angiogram;  Surgeon: Nick Lagos MD;  Location:  HEART CARDIAC CATH LAB    CV RIGHT HEART CATH MEASUREMENTS RECORDED N/A 10/9/2023    Procedure: Right Heart Catheterization;  Surgeon: Nick Lagos MD;  Location:  HEART CARDIAC CATH LAB    CV TRANSCATHETER AORTIC VALVE REPLACEMENT-FEMORAL APPROACH N/A 10/25/2023    Procedure: Transcatheter aortic valve replacement with any indicated procedure- Guevara.;  Surgeon: Jarocho Heredia MD;  Location:  OR    OR TRANSCATHETER AORTIC VALVE REPLACEMENT, FEMORAL PERCUTANEOUS APPROACH (STANDBY) N/A 10/25/2023    Procedure: OR TRANSCATHETER AORTIC VALVE REPLACEMENT, OPEN FEMORAL ARTERY APPROACH- Guevara;  Surgeon: Dinh Rodriguez MD;  Location:  OR      CURRENT MEDICATIONS:     Current Outpatient Medications   Medication Sig Dispense Refill    acetaminophen (TYLENOL) 500 MG tablet Take 500-1,000 mg by mouth every 6 hours as needed for mild pain or fever      albuterol (PROAIR HFA/PROVENTIL HFA/VENTOLIN HFA) 108 (90 Base) MCG/ACT inhaler Inhale 1-2 Puffs every 6 hours as needed for Wheezing.      amLODIPine (NORVASC) 2.5 MG tablet Take 1 tablet (2.5 mg) by mouth at bedtime 90 tablet 3    aspirin 81 MG EC tablet Take 1 tablet (81 mg) by mouth daily 90 tablet 3    atorvastatin (LIPITOR) 20 MG tablet Take 20 mg by mouth daily      blood glucose (ONETOUCH VERIO IQ) test strip TEST BLOOD SUGAR ONCE WEEKLY. (Patient not taking: Reported on 11/27/2023)      fluticasone (FLOVENT HFA) 44 MCG/ACT inhaler Inhale 2 puffs into the lungs 2 times daily      hydrochlorothiazide (HYDRODIURIL) 25 MG tablet Take 1 tablet (25 mg) by mouth daily      lisinopril (ZESTRIL) 40 MG tablet Take 40 mg by mouth daily      metoprolol succinate ER (TOPROL XL) 25 MG 24 hr tablet Take 4 tablets (100 mg) by mouth daily          ALLERGIES:   No Known  Allergies     FAMILY HISTORY:   No family history on file.     SOCIAL HISTORY:     Social History     Socioeconomic History    Marital status:    Tobacco Use    Smoking status: Never    Smokeless tobacco: Never      REVIEW OF SYSTEMS:     Constitutional: No fevers or chills  Skin: No new rash or itching  Eyes: No acute change in vision  Ears/Nose/Throat: No purulent rhinorrhea, new hearing loss, or new vertigo  Respiratory: No cough or hemoptysis  Cardiovascular: See HPI  Gastrointestinal: No change in appetite, vomiting, hematemesis or diarrhea  Genitourinary: No dysuria or hematuria  Musculoskeletal: No new back pain, neck pain or muscle pain  Neurologic: No new headaches, focal weakness or behavior changes  Psychiatric: No hallucinations, excessive alcohol consumption or illegal drug usage  Hematologic/Lymphatic/Immunologic: No bleeding, chills, fever, night sweats or weight loss  Endocrine: No new cold intolerance, heat intolerance, polyphagia, polydipsia or polyuria      PHYSICAL EXAMINATION:   BP (!) 156/69 (BP Location: Right arm, Patient Position: Chair, Cuff Size: Adult Regular)   Pulse 64   Wt 73.6 kg (162 lb 3.2 oz)   SpO2 100%   BMI 29.67 kg/m      HOME BP LOG  138/57 /61. 131/53/56. 130/54/56. 135/59/60. 134/54/58. 135/62/56. 133/66/61. 128/51/60. 132/51/61. 138/66/59. 131/53/53. 131/51/54. 130/57/56. 127/53/55. 120/51/55. 134/61/64. 119/52/61. 123/55/60     General: NAD  CV: Normal S1 S2, soft systolic murmur RUSB, much improved   Resp: Clear LS  Extremities: Normal pulses  Neuro: Normal     LABORATORY DATA:     CBC RESULTS:  Lab Results   Component Value Date    WBC 5.6 11/27/2023    RBC 4.03 11/27/2023    HGB 12.3 11/27/2023    HCT 37.8 11/27/2023    MCV 94 11/27/2023    MCH 30.5 11/27/2023    MCHC 32.5 11/27/2023    RDW 12.5 11/27/2023     (L) 11/27/2023       BMP RESULTS:  Lab Results   Component Value Date     11/27/2023    POTASSIUM 4.0 11/27/2023    CHLORIDE 102  11/27/2023    CO2 30 (H) 11/27/2023    ANIONGAP 9 11/27/2023     (H) 11/27/2023     (H) 10/26/2023    BUN 17.4 11/27/2023    CR 0.63 11/27/2023    GFRESTIMATED 90 11/27/2023    BUSHRA 9.5 11/27/2023         PROCEDURES & FURTHER ASSESSMENTS:   EKG 3/25/24:      EKG 11/27/23:  NSR HR 67, normal QRSd 82 ms     ECHO 11/27/23:  Interpretation Summary  S/P TAVR with 23mm Guevara Resilia S3 on 10/25/23.  The mean gradient across the aortic valve is 14 mmHg.  Trace paravalvular AI.  No significant changes noted.  ______________________________________________________________________________  Left Ventricle  S/P TAVR with 23mm Guevara Resilia S3 on 10/25/23. Global and regional left  ventricular function is normal with an EF of 60-65%. Left ventricular wall  thickness is normal. Left ventricular size is normal. Left ventricular  diastolic function is indeterminate. Diastolic Doppler findings (E/E' ratio  and/or other parameters) suggest left ventricular filling pressures are  increased. No regional wall motion abnormalities are seen.     Right Ventricle  Right ventricular function, chamber size, wall motion, and thickness are  normal.     Atria  Both atria appear normal.     Mitral Valve  The mitral valve is normal. Mild mitral insufficiency is present.     Aortic Valve  Trace paravalvular AI. The mean gradient across the aortic valve is 14 mmHg.     Tricuspid Valve  The tricuspid valve is normal. Trace to mild tricuspid insufficiency is  present. The right ventricular systolic pressure is approximated at 32.7 mmHg  plus the right atrial pressure. Pulmonary artery systolic pressure is normal.     Pulmonic Valve  The pulmonic valve is normal. Trace pulmonic insufficiency is present.     Vessels  The thoracic aorta is normal. The pulmonary artery is normal. The inferior  vena cava is normal.     Pericardium  No pericardial effusion is present.     Compared to Previous Study  No significant changes noted.    Cardiac  monitor 11/2023:  NSR   < 0.1% AF was found   1 episode of SVT   2 episodes NSVT  for 4 beats  No heart block       EKG 10/26/23 POD 1: NSR HR 98       Echocardiogram 10/26/23 POD 1:  Interpretation Summary  TAVR with 23mm Guevara Resilia S3 on 10/25/23.  Valve is well seated with normal doppler assessment. PV 2.5m/s, MG 12mmHg; no  valvular or paravalvular AI.  Left ventricular function is normal.The ejection fraction is > 65%.  Global RV function appears normal on limited views.  The inferior vena cava is normal.  No pericardial effusion.     Echocardiogram (intra op): 10/25/2023:  Interpretation Summary  PROCEDURE  Intra-procedural transthoracic echocardiogram for transfemoral transcatheter  aortic valve replacement with 23MM TERELL 3 ULTRA. Image acquisition  by sonographer.     PRE-PROCEDURAL FINDINGS:  1. Severe calcific aortic stenosis.  2. Normal systolic function. Visual LVEF 55%.     POST-PROCEDURAL SURVEY:  1. 23MM TERELL 3 ULTRA Aortic Valve was successfully deployed.  2. Valve is well seated. There is trivial periprosthetic regurgitation.  3. Post-procedure valve hemodynamics - mean gradient 4.0 mmHg.  4. No pericardial effusion    NYHA Class: I     CLINICAL IMPRESSION:     Lacey Casarez is a very pleasant 78 year old female who presents for 6 month TAVR follow-up. She has a history of severe aortic valvular stenosis with NYHA class II heart failure symptoms that was treated with a transfemoral transcatheter aortic valve replacement (TAVR) with a 23mm Guevara Etrell 3 Resilia on 10/25/23 by Clarice Heredia and Jaspreet.  The TAVR and post-procedural course were notable for syncope and orthostatic hypotension which resolved with IVF. Her EKG showed no conduction abnormalities, telemetry showed paroxysmal SVT. Her PTA BB was resumed. Her POD #1 echo showed normal TAVR function  with mean PG 12 mmHG without PVL. She was discharged on ASA 81 mg daily and cardiac monitor which showed no high  degree AVB.    Severe aortic stenosis s/p TAVR 10/25/23:   Chronic HFpEF, resolved post TAVR:  Patient is doing well clinically.  She is active without cardiac symptoms.  Last ECHO 11/2023 TAVR function with mean pressure gradient of 14 mmHg with trace paravalvular aortic insufficiency. EKG today shows NSR. Plan to continue aspirin 81 mg daily lifelong and SBE prophylaxis lifelong.  Repeat echocardiogram in 6 months.     3.  Mild CAD:  4.  Hyperlipidemia, well controlled:  5.  Hypertension, well controlled:  Pre TAVR cath with mild CAD. Currently no anginal symptoms.  Her cholesterol levels were well-controlled in September 2023 at [facility] with LDL of 80. BP well controlled on current regimen. History of orthostatic hypotension post TAVR without recurrence. Continue aspirin 81 mg and atorvastatin 20 mg daily.  Continue current BP regimen with lisinopril 40 mg, hydrochlorothiazide 25 mg daily, amlodipine 2.5 HS.    6.  Paroxysmal SVT and NSVT:  Cardiac monitor post TAVR showed no high degree AVB but notable for 3 brief runs of SVT and NSVT. EKG today WNL HR 65.   - Continue metoprolol  mg daily     RTC: 6 months with me echo and labs prior       HONEY Preston, CNP  [facility] Cardiology Team    CC  Patient Care Team:  Tiff Sparrow MD as PCP - General (Family Medicine)  Dee Lopez NP as Assigned Heart and Vascular Provider          Optimal Vascular Metrics    Blood Pressure   BP < 140/90 No: well controlled at home     On Aspirin  Yes    On Statin  Yes    Tobacco use  No      Please do not hesitate to contact me if you have any questions/concerns.     Sincerely,     Dee Lopez NP

## 2024-03-26 LAB
ATRIAL RATE - MUSE: 65 BPM
DIASTOLIC BLOOD PRESSURE - MUSE: NORMAL MMHG
INTERPRETATION ECG - MUSE: NORMAL
P AXIS - MUSE: 56 DEGREES
PR INTERVAL - MUSE: 146 MS
QRS DURATION - MUSE: 86 MS
QT - MUSE: 428 MS
QTC - MUSE: 445 MS
R AXIS - MUSE: 31 DEGREES
SYSTOLIC BLOOD PRESSURE - MUSE: NORMAL MMHG
T AXIS - MUSE: 29 DEGREES
VENTRICULAR RATE- MUSE: 65 BPM

## 2024-10-28 ENCOUNTER — OFFICE VISIT (OUTPATIENT)
Dept: CARDIOLOGY | Facility: CLINIC | Age: 79
End: 2024-10-28
Attending: NURSE PRACTITIONER
Payer: COMMERCIAL

## 2024-10-28 ENCOUNTER — LAB (OUTPATIENT)
Dept: LAB | Facility: CLINIC | Age: 79
End: 2024-10-28
Attending: NURSE PRACTITIONER
Payer: COMMERCIAL

## 2024-10-28 VITALS
DIASTOLIC BLOOD PRESSURE: 77 MMHG | SYSTOLIC BLOOD PRESSURE: 166 MMHG | WEIGHT: 162.9 LBS | OXYGEN SATURATION: 99 % | BODY MASS INDEX: 29.79 KG/M2 | HEART RATE: 69 BPM

## 2024-10-28 DIAGNOSIS — Z95.2 S/P TAVR (TRANSCATHETER AORTIC VALVE REPLACEMENT): ICD-10-CM

## 2024-10-28 DIAGNOSIS — I25.10 CORONARY ARTERY DISEASE INVOLVING NATIVE HEART, UNSPECIFIED VESSEL OR LESION TYPE, UNSPECIFIED WHETHER ANGINA PRESENT: ICD-10-CM

## 2024-10-28 DIAGNOSIS — I10 BENIGN ESSENTIAL HYPERTENSION: ICD-10-CM

## 2024-10-28 DIAGNOSIS — Z95.2 S/P TAVR (TRANSCATHETER AORTIC VALVE REPLACEMENT): Primary | ICD-10-CM

## 2024-10-28 LAB
ALT SERPL W P-5'-P-CCNC: 13 U/L (ref 0–50)
ANION GAP SERPL CALCULATED.3IONS-SCNC: 11 MMOL/L (ref 7–15)
BUN SERPL-MCNC: 15 MG/DL (ref 8–23)
CALCIUM SERPL-MCNC: 9.5 MG/DL (ref 8.8–10.4)
CHLORIDE SERPL-SCNC: 103 MMOL/L (ref 98–107)
CHOLEST SERPL-MCNC: 151 MG/DL
CREAT SERPL-MCNC: 0.55 MG/DL (ref 0.51–0.95)
EGFRCR SERPLBLD CKD-EPI 2021: >90 ML/MIN/1.73M2
ERYTHROCYTE [DISTWIDTH] IN BLOOD BY AUTOMATED COUNT: 12.9 % (ref 10–15)
FASTING STATUS PATIENT QL REPORTED: ABNORMAL
FASTING STATUS PATIENT QL REPORTED: NORMAL
GLUCOSE SERPL-MCNC: 105 MG/DL (ref 70–99)
HCO3 SERPL-SCNC: 28 MMOL/L (ref 22–29)
HCT VFR BLD AUTO: 37.2 % (ref 35–47)
HDLC SERPL-MCNC: 72 MG/DL
HGB BLD-MCNC: 12.1 G/DL (ref 11.7–15.7)
LDLC SERPL CALC-MCNC: 57 MG/DL
LVEF ECHO: NORMAL
MCH RBC QN AUTO: 30 PG (ref 26.5–33)
MCHC RBC AUTO-ENTMCNC: 32.5 G/DL (ref 31.5–36.5)
MCV RBC AUTO: 92 FL (ref 78–100)
NONHDLC SERPL-MCNC: 79 MG/DL
PLATELET # BLD AUTO: 130 10E3/UL (ref 150–450)
POTASSIUM SERPL-SCNC: 3.8 MMOL/L (ref 3.4–5.3)
RBC # BLD AUTO: 4.03 10E6/UL (ref 3.8–5.2)
SODIUM SERPL-SCNC: 142 MMOL/L (ref 135–145)
TRIGL SERPL-MCNC: 109 MG/DL
WBC # BLD AUTO: 5.3 10E3/UL (ref 4–11)

## 2024-10-28 PROCEDURE — 84460 ALANINE AMINO (ALT) (SGPT): CPT | Performed by: PATHOLOGY

## 2024-10-28 PROCEDURE — 85027 COMPLETE CBC AUTOMATED: CPT | Performed by: PATHOLOGY

## 2024-10-28 PROCEDURE — 80061 LIPID PANEL: CPT | Performed by: PATHOLOGY

## 2024-10-28 PROCEDURE — 80048 BASIC METABOLIC PNL TOTAL CA: CPT | Performed by: PATHOLOGY

## 2024-10-28 PROCEDURE — G0463 HOSPITAL OUTPT CLINIC VISIT: HCPCS | Performed by: NURSE PRACTITIONER

## 2024-10-28 PROCEDURE — 99214 OFFICE O/P EST MOD 30 MIN: CPT | Mod: 25 | Performed by: NURSE PRACTITIONER

## 2024-10-28 PROCEDURE — 36415 COLL VENOUS BLD VENIPUNCTURE: CPT | Performed by: PATHOLOGY

## 2024-10-28 PROCEDURE — 93306 TTE W/DOPPLER COMPLETE: CPT | Performed by: INTERNAL MEDICINE

## 2024-10-28 PROCEDURE — 93005 ELECTROCARDIOGRAM TRACING: CPT

## 2024-10-28 RX ORDER — METOPROLOL SUCCINATE 100 MG/1
100 TABLET, EXTENDED RELEASE ORAL DAILY
Qty: 90 TABLET | Refills: 3 | Status: SHIPPED | OUTPATIENT
Start: 2024-10-28

## 2024-10-28 RX ORDER — LISINOPRIL 40 MG/1
40 TABLET ORAL DAILY
Qty: 90 TABLET | Refills: 3 | Status: SHIPPED | OUTPATIENT
Start: 2024-10-28

## 2024-10-28 RX ORDER — HYDROCHLOROTHIAZIDE 25 MG/1
25 TABLET ORAL DAILY
Qty: 90 TABLET | Refills: 3 | Status: SHIPPED | OUTPATIENT
Start: 2024-10-28

## 2024-10-28 RX ORDER — AMLODIPINE BESYLATE 2.5 MG/1
2.5 TABLET ORAL AT BEDTIME
Qty: 90 TABLET | Refills: 3 | Status: SHIPPED | OUTPATIENT
Start: 2024-10-28

## 2024-10-28 RX ORDER — ATORVASTATIN CALCIUM 20 MG/1
20 TABLET, FILM COATED ORAL DAILY
Qty: 90 TABLET | Refills: 3 | Status: SHIPPED | OUTPATIENT
Start: 2024-10-28

## 2024-10-28 ASSESSMENT — PAIN SCALES - GENERAL: PAINLEVEL_OUTOF10: NO PAIN (0)

## 2024-10-28 NOTE — NURSING NOTE
Chief Complaint   Patient presents with    Follow Up     TAVR 305-425 days Follow up       Vitals were taken, medications reconciled and EKG performed.    Jorge Brown, Clinic Assistant   2:26 PM

## 2024-10-28 NOTE — PROGRESS NOTES
Structural Heart Coordinator visit:  Provided additional education regarding TAVR/MitraClip procedure, after being present for discussion with physician. Explained the work-up process and next steps for patient. Patient provided our direct contact number and instructed to call with any questions.     1 year S/P TAVR        KCCQ Results:   1a. 5  1b. 4  1c. 4  2. 5  3. 7  4. 7  5. 5  6. 5  7. 5  8a. 5  8b. 5  8c. 5    Valerie Gonzalez CMA  Structural Heart   [facility]

## 2024-10-28 NOTE — PROGRESS NOTES
[facility] HEART [facility]  CARDIOVASCULAR DIVISION    VALVE CLINIC RETURN VISIT    PRIMARY CARDIOLOGIST: [facility]       PERTINENT CLINICAL HISTORY:     Lacey Casarez is a very pleasant 79 year old female who presents for 1 year TAVR follow-up. She has a history of severe aortic valvular stenosis with NYHA class II heart failure symptoms that was treated with a transfemoral transcatheter aortic valve replacement (TAVR) with a 23mm Guevara Terell 3 Resilia on 10/25/23 by Clarice Heredia and Jaspreet.  The TAVR and post-procedural course were notable for syncope and orthostatic hypotension which resolved with IVF. Her EKG showed no conduction abnormalities, telemetry showed paroxysmal SVT. Her PTA metoprolol was resumed. Her POD #1 echo showed normal TAVR function with mean PG 12 mmHG without PVL. She was discharged on ASA 81 mg daily and cardiac monitor which showed no high degree AVB.     She has been doing very well. She reports improvement in shortness of breath since her valve replacement and no longer requires her inhaler. She has been performing ADLs including cooking, cleaning, laundry which in involves going up and down the stairs. She goes to the store or the mall three times a week where she walks around for 20-30 minutes. She goes to [employer] every Sunday. She has no chest pain, shortness of breath, lightheadedness, palpitations, syncope. She reports occasional left foot swelling when she travels longer distances. Seems to occur when she is having pain in her left knee due to arthritis. No swelling in right leg, no orthopnea, PND or weight gain. Her blood pressure is well controlled at home now on low dose amlodipine, losartan, hydrochlorothiazide, metoprolol.     Home BP well controlled 120-130s. She is generally high in clinic due to anxiety.      PAST MEDICAL HISTORY:   HTN  HLD  Pre diabetes  Paroxysmal SVT  Severe aortic stenosis s/p TAVR  Chronic diastolic heart failure, resolved post TAVR       PAST SURGICAL HISTORY:     Past Surgical History:   Procedure Laterality Date    CV CORONARY ANGIOGRAM N/A 10/9/2023    Procedure: Coronary Angiogram;  Surgeon: Nick Lagos MD;  Location:  HEART CARDIAC CATH LAB    CV RIGHT HEART CATH MEASUREMENTS RECORDED N/A 10/9/2023    Procedure: Right Heart Catheterization;  Surgeon: Nick Lagos MD;  Location:  HEART CARDIAC CATH LAB    CV TRANSCATHETER AORTIC VALVE REPLACEMENT-FEMORAL APPROACH N/A 10/25/2023    Procedure: Transcatheter aortic valve replacement with any indicated procedure- Guevara.;  Surgeon: Jarocho Heredia MD;  Location:  OR    OR TRANSCATHETER AORTIC VALVE REPLACEMENT, FEMORAL PERCUTANEOUS APPROACH (STANDBY) N/A 10/25/2023    Procedure: OR TRANSCATHETER AORTIC VALVE REPLACEMENT, OPEN FEMORAL ARTERY APPROACH- Guevara;  Surgeon: Dinh Rodriguez MD;  Location:  OR      CURRENT MEDICATIONS:     Current Outpatient Medications   Medication Sig Dispense Refill    acetaminophen (TYLENOL) 500 MG tablet Take 500-1,000 mg by mouth every 6 hours as needed for mild pain or fever      albuterol (PROAIR HFA/PROVENTIL HFA/VENTOLIN HFA) 108 (90 Base) MCG/ACT inhaler Inhale 1-2 Puffs every 6 hours as needed for Wheezing.      amLODIPine (NORVASC) 2.5 MG tablet Take 1 tablet (2.5 mg) by mouth at bedtime 90 tablet 3    aspirin 81 MG EC tablet Take 1 tablet (81 mg) by mouth daily 90 tablet 3    atorvastatin (LIPITOR) 20 MG tablet Take 20 mg by mouth daily      blood glucose (ONETOUCH VERIO IQ) test strip TEST BLOOD SUGAR ONCE WEEKLY. (Patient not taking: Reported on 11/27/2023)      fluticasone (FLOVENT HFA) 44 MCG/ACT inhaler Inhale 2 puffs into the lungs 2 times daily      hydrochlorothiazide (HYDRODIURIL) 25 MG tablet Take 1 tablet (25 mg) by mouth daily      lisinopril (ZESTRIL) 40 MG tablet Take 40 mg by mouth daily      metoprolol succinate ER (TOPROL XL) 25 MG 24 hr tablet Take 4 tablets (100 mg) by mouth  daily          ALLERGIES:   No Known Allergies     FAMILY HISTORY:   No family history on file.     SOCIAL HISTORY:     Social History     Socioeconomic History    Marital status:    Tobacco Use    Smoking status: Never    Smokeless tobacco: Never      REVIEW OF SYSTEMS:     Constitutional: No fevers or chills  Skin: No new rash or itching  Eyes: No acute change in vision  Ears/Nose/Throat: No purulent rhinorrhea, new hearing loss, or new vertigo  Respiratory: No cough or hemoptysis  Cardiovascular: See HPI  Gastrointestinal: No change in appetite, vomiting, hematemesis or diarrhea  Genitourinary: No dysuria or hematuria  Musculoskeletal: No new back pain, neck pain or muscle pain  Neurologic: No new headaches, focal weakness or behavior changes  Psychiatric: No hallucinations, excessive alcohol consumption or illegal drug usage  Hematologic/Lymphatic/Immunologic: No bleeding, chills, fever, night sweats or weight loss  Endocrine: No new cold intolerance, heat intolerance, polyphagia, polydipsia or polyuria      PHYSICAL EXAMINATION:   BP (!) 166/77 (BP Location: Left arm, Patient Position: Chair, Cuff Size: Adult Regular)   Pulse 69   Wt 73.9 kg (162 lb 14.4 oz)   SpO2 99%   BMI 29.79 kg/m      HOME BP LOG  138/57 /61. 131/53/56. 130/54/56. 135/59/60. 134/54/58. 135/62/56. 133/66/61. 128/51/60. 132/51/61. 138/66/59. 131/53/53. 131/51/54. 130/57/56. 127/53/55. 120/51/55. 134/61/64. 119/52/61. 123/55/60     General: NAD  CV: Normal S1 S2, soft systolic murmur RUSB, much improved   Resp: Clear LS  Extremities: Normal pulses  Neuro: Normal     LABORATORY DATA:     CBC RESULTS:  Lab Results   Component Value Date    WBC 5.6 11/27/2023    RBC 4.03 11/27/2023    HGB 12.3 11/27/2023    HCT 37.8 11/27/2023    MCV 94 11/27/2023    MCH 30.5 11/27/2023    MCHC 32.5 11/27/2023    RDW 12.5 11/27/2023     (L) 11/27/2023       BMP RESULTS:  Lab Results   Component Value Date     11/27/2023    POTASSIUM 4.0  11/27/2023    CHLORIDE 102 11/27/2023    CO2 30 (H) 11/27/2023    ANIONGAP 9 11/27/2023     (H) 11/27/2023     (H) 10/26/2023    BUN 17.4 11/27/2023    CR 0.63 11/27/2023    GFRESTIMATED 90 11/27/2023    BUSHRA 9.5 11/27/2023         PROCEDURES & FURTHER ASSESSMENTS:     ECHO 10/28/24    Interpretation Summary  Left ventricular size, wall motion and function are normal. The ejection  fraction is 60-65%.  Right ventricular function, chamber size, wall motion, and thickness are  normal.  Mild mitral insufficiency is present.  S/P TAVR with 23mm Guevara Resilia S3 on 10/25/23. Trace paravalvular AI. The  mean gradient across the aortic valve is 14 mmHg.  Trace tricuspid insufficiency is present. The inferior vena cava was normal in  size with preserved respiratory variability. No pericardial effusion is  present.     No significant changes noted.  ______________________________________________________________________________  Left Ventricle  Left ventricular size, wall motion and function are normal. The ejection  fraction is 60-65%. Grade II or moderate diastolic dysfunction. No regional  wall motion abnormalities are seen.     Right Ventricle  Right ventricular function, chamber size, wall motion, and thickness are  normal.     Atria  Both atria appear normal.     Mitral Valve  Mild mitral insufficiency is present.     Aortic Valve  S/P TAVR with 23mm Guevara Resilia S3 on 10/25/23. Trace paravalvular AI. The  mean gradient across the aortic valve is 14 mmHg.     Tricuspid Valve  Trace tricuspid insufficiency is present. The right ventricular systolic  pressure is approximated at 36.0 mmHg plus the right atrial pressure.     Pulmonic Valve  The pulmonic valve is normal. Trace pulmonic insufficiency is present.     Vessels  The aorta root is normal. The inferior vena cava was normal in size with  preserved respiratory variability.     Pericardium  No pericardial effusion is present.     Compared to Previous  Study  No significant changes noted.  ______________________________________________________________________________  MMode/2D Measurements & Calculations     IVSd: 1.0 cm  LVIDd: 5.1 cm  LVIDs: 2.9 cm  LVPWd: 1.0 cm  FS: 42.7 %  LV mass(C)d: 190.1 grams  LV mass(C)dI: 108.8 grams/m2  asc Aorta Diam: 3.0 cm  LVOT diam: 2.1 cm  LVOT area: 3.5 cm2  Asc Ao diam index BSA (cm/m2): 1.7  Asc Ao diam index Ht(cm/m): 1.9  LA Volume (BP): 59.9 ml  LA Volume Index (BP): 34.2 ml/m2     RWT: 0.39  TAPSE: 2.2 cm     Doppler Measurements & Calculations  MV E max cristian: 129.0 cm/sec  MV A max cristian: 90.2 cm/sec  MV E/A: 1.4  MV max P.3 mmHg  MV mean P.7 mmHg  MV V2 VTI: 39.2 cm  MVA(VTI): 3.2 cm2  MV dec slope: 600.7 cm/sec2  MV dec time: 0.21 sec  Ao V2 max: 241.5 cm/sec  Ao max P.3 mmHg  Ao V2 mean: 172.9 cm/sec  Ao mean P.2 mmHg  Ao V2 VTI: 62.1 cm  PAUL(I,D): 2.0 cm2  PAUL(V,D): 1.8 cm2  LV V1 max P.2 mmHg  LV V1 max: 124.7 cm/sec  LV V1 VTI: 36.3 cm  SV(LVOT): 125.8 ml  SI(LVOT): 72.0 ml/m2  TR max cristian: 300.0 cm/sec  TR max P.0 mmHg  AV Cristian Ratio (DI): 0.52  PAUL Index (cm2/m2): 1.2  Lateral E/e': 21.2  RV S Cristian: 10.4 cm/sec     EKG 10/28/24  NSR w/PACS, normal QRSd    EKG 3/25/24:      EKG 23:  NSR HR 67, normal QRSd 82 ms     ECHO 23:  Interpretation Summary  S/P TAVR with 23mm Guevara Resilia S3 on 10/25/23.  The mean gradient across the aortic valve is 14 mmHg.  Trace paravalvular AI.  No significant changes noted.  ______________________________________________________________________________  Left Ventricle  S/P TAVR with 23mm Guevara Resilia S3 on 10/25/23. Global and regional left  ventricular function is normal with an EF of 60-65%. Left ventricular wall  thickness is normal. Left ventricular size is normal. Left ventricular  diastolic function is indeterminate. Diastolic Doppler findings (E/E' ratio  and/or other parameters) suggest left ventricular filling pressures are  increased.  No regional wall motion abnormalities are seen.     Right Ventricle  Right ventricular function, chamber size, wall motion, and thickness are  normal.     Atria  Both atria appear normal.     Mitral Valve  The mitral valve is normal. Mild mitral insufficiency is present.     Aortic Valve  Trace paravalvular AI. The mean gradient across the aortic valve is 14 mmHg.     Tricuspid Valve  The tricuspid valve is normal. Trace to mild tricuspid insufficiency is  present. The right ventricular systolic pressure is approximated at 32.7 mmHg  plus the right atrial pressure. Pulmonary artery systolic pressure is normal.     Pulmonic Valve  The pulmonic valve is normal. Trace pulmonic insufficiency is present.     Vessels  The thoracic aorta is normal. The pulmonary artery is normal. The inferior  vena cava is normal.     Pericardium  No pericardial effusion is present.     Compared to Previous Study  No significant changes noted.    Cardiac monitor 11/2023:  NSR   < 0.1% AF was found   1 episode of SVT   2 episodes NSVT  for 4 beats  No heart block       EKG 10/26/23 POD 1: NSR HR 98       Echocardiogram 10/26/23 POD 1:  Interpretation Summary  TAVR with 23mm Guevara Resilia S3 on 10/25/23.  Valve is well seated with normal doppler assessment. PV 2.5m/s, MG 12mmHg; no  valvular or paravalvular AI.  Left ventricular function is normal.The ejection fraction is > 65%.  Global RV function appears normal on limited views.  The inferior vena cava is normal.  No pericardial effusion.     Echocardiogram (intra op): 10/25/2023:  Interpretation Summary  PROCEDURE  Intra-procedural transthoracic echocardiogram for transfemoral transcatheter  aortic valve replacement with 23MM ESTRELLITA 3 ULTRA. Image acquisition  by sonographer.     PRE-PROCEDURAL FINDINGS:  1. Severe calcific aortic stenosis.  2. Normal systolic function. Visual LVEF 55%.     POST-PROCEDURAL SURVEY:  1. 23MM ESTRELLITA 3 ULTRA Aortic Valve was successfully deployed.  2.  Valve is well seated. There is trivial periprosthetic regurgitation.  3. Post-procedure valve hemodynamics - mean gradient 4.0 mmHg.  4. No pericardial effusion    NYHA Class: I     CLINICAL IMPRESSION:     Lacey Casarez is a very pleasant 79 year old female who presents for 1 year TAVR follow-up. She has a history of severe aortic valvular stenosis with NYHA class II heart failure symptoms that was treated with a transfemoral transcatheter aortic valve replacement (TAVR) with a 23mm Guevara Terell 3 Resilia on 10/25/23 by Clarice Heredia and Jaspreet.  The TAVR and post-procedural course were notable for syncope and orthostatic hypotension which resolved with IVF. Her EKG showed no conduction abnormalities, telemetry showed paroxysmal SVT. Her PTA BB was resumed. Her POD #1 echo showed normal TAVR function  with mean PG 12 mmHG without PVL. She was discharged on ASA 81 mg daily and cardiac monitor which showed no high degree AVB.    Severe aortic stenosis s/p TAVR 10/25/23:   Chronic HFpEF, resolved post TAVR:  Patient is doing well clinically.  She is active without cardiac symptoms.  ECHO today show normal TAVR function with mean pressure gradient of 14 mmHg with trace paravalvular aortic insufficiency. EKG today shows NSR. Plan to continue aspirin 81 mg daily lifelong and SBE prophylaxis lifelong.  Repeat echocardiogram in 1 year.     3.  Mild CAD:  4.  Hyperlipidemia, well controlled:  5.  Hypertension, well controlled:  Pre TAVR cath with mild CAD. Currently no anginal symptoms.  Her cholesterol levels were well-controlled in September 2023 at [facility] with LDL of 80. BP well controlled on current regimen. History of orthostatic hypotension post TAVR without recurrence. Continue aspirin 81 mg and atorvastatin 20 mg daily.  Continue current BP regimen with lisinopril 40 mg, hydrochlorothiazide 25 mg daily, amlodipine 2.5 HS.    6.  Paroxysmal SVT and NSVT:  Cardiac monitor post TAVR showed no high  degree AVB but notable for 3 brief runs of SVT and NSVT. EKG today WNL HR 65.   - Continue metoprolol  mg daily     7. Left pedal edema  8. Left knee arthritis  No s/s of heart failure or DVT. Suspect related to arthritis. Continue RICE therapy.     RTC: 1 year with me echo and labs prior       HONEY Preston, CNP  [facility] Cardiology Team    CC  Patient Care Team:  Tiff Sparrow MD as PCP - General (Family Medicine)  Dee Lopez NP as Assigned Heart and Vascular Provider

## 2024-10-28 NOTE — LETTER
10/28/2024      RE: Lacey Casarez  [address]  [place] [address]       Dear Colleague,    Thank you for the opportunity to participate in the care of your patient, Lacey Casarez, at the [facility]ART [facility] [place] at [facility]. Please see a copy of my visit note below.        [facility] HEART CARE  CARDIOVASCULAR DIVISION    VALVE CLINIC RETURN VISIT    PRIMARY CARDIOLOGIST: [facility]       PERTINENT CLINICAL HISTORY:     Lacey Casarez is a very pleasant 79 year old female who presents for 1 year TAVR follow-up. She has a history of severe aortic valvular stenosis with NYHA class II heart failure symptoms that was treated with a transfemoral transcatheter aortic valve replacement (TAVR) with a 23mm Guevara Terell 3 Resilia on 10/25/23 by Clarice Heredia and Jaspreet.  The TAVR and post-procedural course were notable for syncope and orthostatic hypotension which resolved with IVF. Her EKG showed no conduction abnormalities, telemetry showed paroxysmal SVT. Her PTA metoprolol was resumed. Her POD #1 echo showed normal TAVR function with mean PG 12 mmHG without PVL. She was discharged on ASA 81 mg daily and cardiac monitor which showed no high degree AVB.     She has been doing very well. She reports improvement in shortness of breath since her valve replacement and no longer requires her inhaler. She has been performing ADLs including cooking, cleaning, laundry which in involves going up and down the stairs. She goes to the store or the mall three times a week where she walks around for 20-30 minutes. She goes to [employer] every Sunday. She has no chest pain, shortness of breath, lightheadedness, palpitations, syncope. She reports occasional left foot swelling when she travels longer distances. Seems to occur when she is having pain in her left knee due to arthritis. No swelling in right leg, no orthopnea, PND or weight gain. Her blood  pressure is well controlled at home now on low dose amlodipine, losartan, hydrochlorothiazide, metoprolol.     Home BP well controlled 120-130s. She is generally high in clinic due to anxiety.      PAST MEDICAL HISTORY:   HTN  HLD  Pre diabetes  Paroxysmal SVT  Severe aortic stenosis s/p TAVR  Chronic diastolic heart failure, resolved post TAVR      PAST SURGICAL HISTORY:     Past Surgical History:   Procedure Laterality Date     CV CORONARY ANGIOGRAM N/A 10/9/2023    Procedure: Coronary Angiogram;  Surgeon: Nick Lagos MD;  Location:  HEART CARDIAC CATH LAB     CV RIGHT HEART CATH MEASUREMENTS RECORDED N/A 10/9/2023    Procedure: Right Heart Catheterization;  Surgeon: Nick Lagos MD;  Location:  HEART CARDIAC CATH LAB     CV TRANSCATHETER AORTIC VALVE REPLACEMENT-FEMORAL APPROACH N/A 10/25/2023    Procedure: Transcatheter aortic valve replacement with any indicated procedure- Guevara.;  Surgeon: Jarocho Heredia MD;  Location: [facility] OR     OR TRANSCATHETER AORTIC VALVE REPLACEMENT, FEMORAL PERCUTANEOUS APPROACH (STANDBY) N/A 10/25/2023    Procedure: OR TRANSCATHETER AORTIC VALVE REPLACEMENT, OPEN FEMORAL ARTERY APPROACH- Guevara;  Surgeon: Dinh Rodriguez MD;  Location:  OR      CURRENT MEDICATIONS:     Current Outpatient Medications   Medication Sig Dispense Refill     acetaminophen (TYLENOL) 500 MG tablet Take 500-1,000 mg by mouth every 6 hours as needed for mild pain or fever       albuterol (PROAIR HFA/PROVENTIL HFA/VENTOLIN HFA) 108 (90 Base) MCG/ACT inhaler Inhale 1-2 Puffs every 6 hours as needed for Wheezing.       amLODIPine (NORVASC) 2.5 MG tablet Take 1 tablet (2.5 mg) by mouth at bedtime 90 tablet 3     aspirin 81 MG EC tablet Take 1 tablet (81 mg) by mouth daily 90 tablet 3     atorvastatin (LIPITOR) 20 MG tablet Take 20 mg by mouth daily       blood glucose (ONETOUCH VERIO IQ) test strip TEST BLOOD SUGAR ONCE WEEKLY. (Patient not taking: Reported on  11/27/2023)       fluticasone (FLOVENT HFA) 44 MCG/ACT inhaler Inhale 2 puffs into the lungs 2 times daily       hydrochlorothiazide (HYDRODIURIL) 25 MG tablet Take 1 tablet (25 mg) by mouth daily       lisinopril (ZESTRIL) 40 MG tablet Take 40 mg by mouth daily       metoprolol succinate ER (TOPROL XL) 25 MG 24 hr tablet Take 4 tablets (100 mg) by mouth daily          ALLERGIES:   No Known Allergies     FAMILY HISTORY:   No family history on file.     SOCIAL HISTORY:     Social History     Socioeconomic History     Marital status:    Tobacco Use     Smoking status: Never     Smokeless tobacco: Never      REVIEW OF SYSTEMS:     Constitutional: No fevers or chills  Skin: No new rash or itching  Eyes: No acute change in vision  Ears/Nose/Throat: No purulent rhinorrhea, new hearing loss, or new vertigo  Respiratory: No cough or hemoptysis  Cardiovascular: See HPI  Gastrointestinal: No change in appetite, vomiting, hematemesis or diarrhea  Genitourinary: No dysuria or hematuria  Musculoskeletal: No new back pain, neck pain or muscle pain  Neurologic: No new headaches, focal weakness or behavior changes  Psychiatric: No hallucinations, excessive alcohol consumption or illegal drug usage  Hematologic/Lymphatic/Immunologic: No bleeding, chills, fever, night sweats or weight loss  Endocrine: No new cold intolerance, heat intolerance, polyphagia, polydipsia or polyuria      PHYSICAL EXAMINATION:   BP (!) 166/77 (BP Location: Left arm, Patient Position: Chair, Cuff Size: Adult Regular)   Pulse 69   Wt 73.9 kg (162 lb 14.4 oz)   SpO2 99%   BMI 29.79 kg/m      HOME BP LOG  138/57 /61. 131/53/56. 130/54/56. 135/59/60. 134/54/58. 135/62/56. 133/66/61. 128/51/60. 132/51/61. 138/66/59. 131/53/53. 131/51/54. 130/57/56. 127/53/55. 120/51/55. 134/61/64. 119/52/61. 123/55/60     General: NAD  CV: Normal S1 S2, soft systolic murmur RUSB, much improved   Resp: Clear LS  Extremities: Normal pulses  Neuro: Normal     LABORATORY  DATA:     CBC RESULTS:  Lab Results   Component Value Date    WBC 5.6 11/27/2023    RBC 4.03 11/27/2023    HGB 12.3 11/27/2023    HCT 37.8 11/27/2023    MCV 94 11/27/2023    MCH 30.5 11/27/2023    MCHC 32.5 11/27/2023    RDW 12.5 11/27/2023     (L) 11/27/2023       BMP RESULTS:  Lab Results   Component Value Date     11/27/2023    POTASSIUM 4.0 11/27/2023    CHLORIDE 102 11/27/2023    CO2 30 (H) 11/27/2023    ANIONGAP 9 11/27/2023     (H) 11/27/2023     (H) 10/26/2023    BUN 17.4 11/27/2023    CR 0.63 11/27/2023    GFRESTIMATED 90 11/27/2023    BUSHRA 9.5 11/27/2023         PROCEDURES & FURTHER ASSESSMENTS:     ECHO 10/28/24    Interpretation Summary  Left ventricular size, wall motion and function are normal. The ejection  fraction is 60-65%.  Right ventricular function, chamber size, wall motion, and thickness are  normal.  Mild mitral insufficiency is present.  S/P TAVR with 23mm Guevara Resilia S3 on 10/25/23. Trace paravalvular AI. The  mean gradient across the aortic valve is 14 mmHg.  Trace tricuspid insufficiency is present. The inferior vena cava was normal in  size with preserved respiratory variability. No pericardial effusion is  present.     No significant changes noted.  ______________________________________________________________________________  Left Ventricle  Left ventricular size, wall motion and function are normal. The ejection  fraction is 60-65%. Grade II or moderate diastolic dysfunction. No regional  wall motion abnormalities are seen.     Right Ventricle  Right ventricular function, chamber size, wall motion, and thickness are  normal.     Atria  Both atria appear normal.     Mitral Valve  Mild mitral insufficiency is present.     Aortic Valve  S/P TAVR with 23mm Guevara Resilia S3 on 10/25/23. Trace paravalvular AI. The  mean gradient across the aortic valve is 14 mmHg.     Tricuspid Valve  Trace tricuspid insufficiency is present. The right ventricular  systolic  pressure is approximated at 36.0 mmHg plus the right atrial pressure.     Pulmonic Valve  The pulmonic valve is normal. Trace pulmonic insufficiency is present.     Vessels  The aorta root is normal. The inferior vena cava was normal in size with  preserved respiratory variability.     Pericardium  No pericardial effusion is present.     Compared to Previous Study  No significant changes noted.  ______________________________________________________________________________  MMode/2D Measurements & Calculations     IVSd: 1.0 cm  LVIDd: 5.1 cm  LVIDs: 2.9 cm  LVPWd: 1.0 cm  FS: 42.7 %  LV mass(C)d: 190.1 grams  LV mass(C)dI: 108.8 grams/m2  asc Aorta Diam: 3.0 cm  LVOT diam: 2.1 cm  LVOT area: 3.5 cm2  Asc Ao diam index BSA (cm/m2): 1.7  Asc Ao diam index Ht(cm/m): 1.9  LA Volume (BP): 59.9 ml  LA Volume Index (BP): 34.2 ml/m2     RWT: 0.39  TAPSE: 2.2 cm     Doppler Measurements & Calculations  MV E max cristian: 129.0 cm/sec  MV A max cristian: 90.2 cm/sec  MV E/A: 1.4  MV max P.3 mmHg  MV mean P.7 mmHg  MV V2 VTI: 39.2 cm  MVA(VTI): 3.2 cm2  MV dec slope: 600.7 cm/sec2  MV dec time: 0.21 sec  Ao V2 max: 241.5 cm/sec  Ao max P.3 mmHg  Ao V2 mean: 172.9 cm/sec  Ao mean P.2 mmHg  Ao V2 VTI: 62.1 cm  PAUL(I,D): 2.0 cm2  PAUL(V,D): 1.8 cm2  LV V1 max P.2 mmHg  LV V1 max: 124.7 cm/sec  LV V1 VTI: 36.3 cm  SV(LVOT): 125.8 ml  SI(LVOT): 72.0 ml/m2  TR max cristian: 300.0 cm/sec  TR max P.0 mmHg  AV Cristian Ratio (DI): 0.52  PAUL Index (cm2/m2): 1.2  Lateral E/e': 21.2  RV S Cristian: 10.4 cm/sec     EKG 10/28/24  NSR w/PACS, normal QRSd    EKG 3/25/24:      EKG 23:  NSR HR 67, normal QRSd 82 ms     ECHO 23:  Interpretation Summary  S/P TAVR with 23mm Guevara Resilia S3 on 10/25/23.  The mean gradient across the aortic valve is 14 mmHg.  Trace paravalvular AI.  No significant changes noted.  ______________________________________________________________________________  Left Ventricle  S/P TAVR with  23mm Guevara Resilia S3 on 10/25/23. Global and regional left  ventricular function is normal with an EF of 60-65%. Left ventricular wall  thickness is normal. Left ventricular size is normal. Left ventricular  diastolic function is indeterminate. Diastolic Doppler findings (E/E' ratio  and/or other parameters) suggest left ventricular filling pressures are  increased. No regional wall motion abnormalities are seen.     Right Ventricle  Right ventricular function, chamber size, wall motion, and thickness are  normal.     Atria  Both atria appear normal.     Mitral Valve  The mitral valve is normal. Mild mitral insufficiency is present.     Aortic Valve  Trace paravalvular AI. The mean gradient across the aortic valve is 14 mmHg.     Tricuspid Valve  The tricuspid valve is normal. Trace to mild tricuspid insufficiency is  present. The right ventricular systolic pressure is approximated at 32.7 mmHg  plus the right atrial pressure. Pulmonary artery systolic pressure is normal.     Pulmonic Valve  The pulmonic valve is normal. Trace pulmonic insufficiency is present.     Vessels  The thoracic aorta is normal. The pulmonary artery is normal. The inferior  vena cava is normal.     Pericardium  No pericardial effusion is present.     Compared to Previous Study  No significant changes noted.    Cardiac monitor 11/2023:  NSR   < 0.1% AF was found   1 episode of SVT   2 episodes NSVT  for 4 beats  No heart block       EKG 10/26/23 POD 1: NSR HR 98       Echocardiogram 10/26/23 POD 1:  Interpretation Summary  TAVR with 23mm Guevara Resilia S3 on 10/25/23.  Valve is well seated with normal doppler assessment. PV 2.5m/s, MG 12mmHg; no  valvular or paravalvular AI.  Left ventricular function is normal.The ejection fraction is > 65%.  Global RV function appears normal on limited views.  The inferior vena cava is normal.  No pericardial effusion.     Echocardiogram (intra op): 10/25/2023:  Interpretation  Summary  PROCEDURE  Intra-procedural transthoracic echocardiogram for transfemoral transcatheter  aortic valve replacement with 23MM TERELL 3 ULTRA. Image acquisition  by sonographer.     PRE-PROCEDURAL FINDINGS:  1. Severe calcific aortic stenosis.  2. Normal systolic function. Visual LVEF 55%.     POST-PROCEDURAL SURVEY:  1. 23MM TERELL 3 ULTRA Aortic Valve was successfully deployed.  2. Valve is well seated. There is trivial periprosthetic regurgitation.  3. Post-procedure valve hemodynamics - mean gradient 4.0 mmHg.  4. No pericardial effusion    NYHA Class: I     CLINICAL IMPRESSION:     Lacey Casarez is a very pleasant 79 year old female who presents for 1 year TAVR follow-up. She has a history of severe aortic valvular stenosis with NYHA class II heart failure symptoms that was treated with a transfemoral transcatheter aortic valve replacement (TAVR) with a 23mm Guevara Terell 3 Resilia on 10/25/23 by Clarice Heredia and Jaspreet.  The TAVR and post-procedural course were notable for syncope and orthostatic hypotension which resolved with IVF. Her EKG showed no conduction abnormalities, telemetry showed paroxysmal SVT. Her PTA BB was resumed. Her POD #1 echo showed normal TAVR function  with mean PG 12 mmHG without PVL. She was discharged on ASA 81 mg daily and cardiac monitor which showed no high degree AVB.    Severe aortic stenosis s/p TAVR 10/25/23:   Chronic HFpEF, resolved post TAVR:  Patient is doing well clinically.  She is active without cardiac symptoms.  ECHO today show normal TAVR function with mean pressure gradient of 14 mmHg with trace paravalvular aortic insufficiency. EKG today shows NSR. Plan to continue aspirin 81 mg daily lifelong and SBE prophylaxis lifelong.  Repeat echocardiogram in 1 year.     3.  Mild CAD:  4.  Hyperlipidemia, well controlled:  5.  Hypertension, well controlled:  Pre TAVR cath with mild CAD. Currently no anginal symptoms.  Her cholesterol levels were  well-controlled in September 2023 at [facility] with LDL of 80. BP well controlled on current regimen. History of orthostatic hypotension post TAVR without recurrence. Continue aspirin 81 mg and atorvastatin 20 mg daily.  Continue current BP regimen with lisinopril 40 mg, hydrochlorothiazide 25 mg daily, amlodipine 2.5 HS.    6.  Paroxysmal SVT and NSVT:  Cardiac monitor post TAVR showed no high degree AVB but notable for 3 brief runs of SVT and NSVT. EKG today WNL HR 65.   - Continue metoprolol  mg daily     7. Left pedal edema  8. Left knee arthritis  No s/s of heart failure or DVT. Suspect related to arthritis. Continue RICE therapy.     RTC: 1 year with me echo and labs prior       HONEY Preston, CNP  [facility] Cardiology Team    CC  Patient Care Team:  Tiff Sparrow MD as PCP - General (Family Medicine)  Dee Lopez NP as Assigned Heart and Vascular Provider          Please do not hesitate to contact me if you have any questions/concerns.     Sincerely,     Dee Lopez NP

## 2024-10-28 NOTE — PATIENT INSTRUCTIONS
You were seen today in the Structural Heart Clinic at the [facility].    Cardiology provider you saw during your visit: Dee Lopez NP    Your ECHO, labs and EKG look great! Keep up the good work.     Instructions:   Continue aspirin 81 mg daily lifelong.  Continue all blood pressure medications - your BP looks great!  Switch metoprolol 25 mg x 4 tablets to just 100 mg tablet once daily - same dose just less pills   Delay any nonurgent dental procedures for the first 6 month post TAVR.  For all future dental cleanings and procedures you will need to take antibiotics prior - see instructions below.   Continue daily exercise   You are safe to travel  Follow-up in Valve Clinic in 1 year with echo and labs prior     Prevention of Infective (Bacterial) Endocarditis:  You are at increased risk for developing adverse outcomes from infective endocarditis (IE), also known as bacterial endocarditis (BE) because of the new device in your heart. The guidelines for prevention of IE are to give patients antibiotics prior to any dental procedures that involve manipulation of gingival tissue or the periapical region of teeth, or perforation of the oral mucosa:      It is recommended to take Amoxicillin 2 gm by mouth as a single dose 30 to 60 minutes before procedure.     OR if allergic to Penicillin or Ampicillin:     Cephalexin 2 gm by mouth, or  Clindamycin 600 mg by mouth, or  Azithromycin or Clarithromycin 500 mg PO       Questions and scheduling:   First call: Structural Heart  Macie Gonzalez [phone]    General scheduling line: [phone].   First press #1 for the University and then press #3 for Medical Questions to reach the Cardiology triage nurse.     On Call Cardiologist for after hours or on weekends: [phone], press option #4 and ask to speak to the on-call Cardiologist.

## 2024-10-29 LAB
ATRIAL RATE - MUSE: 73 BPM
DIASTOLIC BLOOD PRESSURE - MUSE: NORMAL MMHG
INTERPRETATION ECG - MUSE: NORMAL
P AXIS - MUSE: 61 DEGREES
PR INTERVAL - MUSE: 146 MS
QRS DURATION - MUSE: 88 MS
QT - MUSE: 388 MS
QTC - MUSE: 427 MS
R AXIS - MUSE: 34 DEGREES
SYSTOLIC BLOOD PRESSURE - MUSE: NORMAL MMHG
T AXIS - MUSE: 31 DEGREES
VENTRICULAR RATE- MUSE: 73 BPM

## 2024-11-11 ENCOUNTER — TELEPHONE (OUTPATIENT)
Dept: CARDIOLOGY | Facility: CLINIC | Age: 79
End: 2024-11-11
Payer: COMMERCIAL

## 2024-11-11 NOTE — TELEPHONE ENCOUNTER
Patient calling looking for her report from Oct 28, 2024 visit with Dee Lopez NP.    Patient is unable to open the echocardiogram. Patient is requesting that  put it on "[employer]" and send it that way.  Approved by Kassie Luis and sent today.     Patient notified    Anna Bailey  [facility] Electrophysiology   [phone]  s

## 2024-12-15 ENCOUNTER — HEALTH MAINTENANCE LETTER (OUTPATIENT)
Age: 79
End: 2024-12-15

## (undated) DEVICE — MANIFOLD KIT ANGIO AUTOMATED 014613

## (undated) DEVICE — Device

## (undated) DEVICE — TUBING PRESSURE 30"

## (undated) DEVICE — WIRE GUIDE 0.035"X150CM EMERALD J TIP 502521

## (undated) DEVICE — SHTH INTRO 0.021IN ID 6FR DIA

## (undated) DEVICE — PACK GOWN 3/PK DISP XL SBA32GPFCB

## (undated) DEVICE — CATH ANGIO SUPERTORQUE AL1 6FRX100CM 532-645

## (undated) DEVICE — PACK HEART RIGHT CUSTOM SAN32RHF18

## (undated) DEVICE — CATH ANGIO INFINITI JR4 4FRX100CM 538421

## (undated) DEVICE — ESU GROUND PAD ADULT W/CORD E7507

## (undated) DEVICE — INTRO SHEATH AVANTI 4FRX23CM 504604T

## (undated) DEVICE — DRAPE STERI FLUOROSCOPE 35X43"1012 LATEX FREE

## (undated) DEVICE — RAD CLOSURE ANGIOSEAL 8FR  610131

## (undated) DEVICE — WIRE GUIDE 0.035"X150CM EMERALD STR 502542

## (undated) DEVICE — SYR 50ML LL W/O NDL 309653

## (undated) DEVICE — CONNECTOR STOPCOCK 3-WAY HIGH PRESSURE (NAMIC) H965700550091

## (undated) DEVICE — DRSG PRIMAPORE 02X3" 7133

## (undated) DEVICE — CATH ANGIO INFINITI PIGTAIL 145 6 SH 6FRX110CM  534-652S

## (undated) DEVICE — SAVVYWIRE XS

## (undated) DEVICE — KIT HAND CONTROL ACIST 016795

## (undated) DEVICE — KIT VALVE AORTIC SAPIEN 3 ULTRA RESILIA OD23 MM S3URCM23A

## (undated) DEVICE — 4IN X 6IN PADPRO RADIOTRANSPARENT ELECTRODE DEFRIBILLATOR ADHESIVE PAD, ADULT

## (undated) DEVICE — DRAPE CV SPLIT TIBURON 87"X136.5" 9155

## (undated) DEVICE — SLEEVE TR BAND RADIAL COMPRESSION DEVICE 29CM XX-RF06L

## (undated) DEVICE — CABLE PACING ALLIGATOR CLIP 12FT 5833SL

## (undated) DEVICE — INTRO SHEATH 7FRX25CM PINNACLE RSS706

## (undated) DEVICE — GW VASC .035IN DIA 260CML 7CML 3 MM RADIUS J CURVE 502455

## (undated) DEVICE — DRSG TEGADERM 4X4 3/4" 1626W

## (undated) DEVICE — LINEN TOWEL PACK X30 5481

## (undated) DEVICE — INFLATION DEVICE ATRION QL2530

## (undated) DEVICE — SPONGE RAY-TEC 4X8" 7318

## (undated) DEVICE — BOWL STERILE 32OZ DYND50320

## (undated) DEVICE — INTRO GLIDESHEATH SLENDER 6FR 10X45CM 60-1060

## (undated) DEVICE — DRAPE IOBAN INCISE 23X17" 6650EZ

## (undated) DEVICE — INTRO SHEATH 4FRX10CM PINNACLE RSS402

## (undated) DEVICE — GLIDEWIRE TERUMO .035X180CM 1.5,, J-TIP GR3525

## (undated) DEVICE — SLEEVE TR BAND RADIAL COMPRESSION DEVICE 24CM TRB24-REG

## (undated) DEVICE — CLOSURE DEVICE 6FR VASC PROGLIDE MEDICATED SUTURE 12673-03

## (undated) DEVICE — PREP CHLORAPREP 26ML TINTED HI-LITE ORANGE 930815

## (undated) DEVICE — INTRO SHEATH 6FRX25CM PINNACLE RSS606

## (undated) DEVICE — WIRE GUIDE LUNDERQUIST 0.035"X260CM DBL CVD

## (undated) DEVICE — INTRO SHEATH 7FRX10CM PINNACLE RSS702

## (undated) RX ORDER — HEPARIN SODIUM 1000 [USP'U]/ML
INJECTION, SOLUTION INTRAVENOUS; SUBCUTANEOUS
Status: DISPENSED
Start: 2023-10-09

## (undated) RX ORDER — CEFAZOLIN SODIUM/WATER 2 G/20 ML
SYRINGE (ML) INTRAVENOUS
Status: DISPENSED
Start: 2023-10-25

## (undated) RX ORDER — SODIUM CHLORIDE 9 MG/ML
INJECTION, SOLUTION INTRAVENOUS
Status: DISPENSED
Start: 2023-10-09

## (undated) RX ORDER — LIDOCAINE HYDROCHLORIDE 10 MG/ML
INJECTION, SOLUTION EPIDURAL; INFILTRATION; INTRACAUDAL; PERINEURAL
Status: DISPENSED
Start: 2023-10-09

## (undated) RX ORDER — PROTAMINE SULFATE 10 MG/ML
INJECTION, SOLUTION INTRAVENOUS
Status: DISPENSED
Start: 2023-10-25

## (undated) RX ORDER — NITROGLYCERIN 5 MG/ML
VIAL (ML) INTRAVENOUS
Status: DISPENSED
Start: 2023-10-09

## (undated) RX ORDER — ASPIRIN 325 MG
TABLET ORAL
Status: DISPENSED
Start: 2023-10-25

## (undated) RX ORDER — FENTANYL CITRATE 50 UG/ML
INJECTION, SOLUTION INTRAMUSCULAR; INTRAVENOUS
Status: DISPENSED
Start: 2023-10-25

## (undated) RX ORDER — BUPIVACAINE HYDROCHLORIDE 5 MG/ML
INJECTION, SOLUTION PERINEURAL
Status: DISPENSED
Start: 2023-10-25

## (undated) RX ORDER — HEPARIN SODIUM 1000 [USP'U]/ML
INJECTION, SOLUTION INTRAVENOUS; SUBCUTANEOUS
Status: DISPENSED
Start: 2023-10-25

## (undated) RX ORDER — ACETAMINOPHEN 325 MG/1
TABLET ORAL
Status: DISPENSED
Start: 2023-10-25

## (undated) RX ORDER — LIDOCAINE 40 MG/G
CREAM TOPICAL
Status: DISPENSED
Start: 2023-10-09

## (undated) RX ORDER — NICARDIPINE HCL-0.9% SOD CHLOR 1 MG/10 ML
SYRINGE (ML) INTRAVENOUS
Status: DISPENSED
Start: 2023-10-09

## (undated) RX ORDER — EPHEDRINE SULFATE 50 MG/ML
INJECTION, SOLUTION INTRAMUSCULAR; INTRAVENOUS; SUBCUTANEOUS
Status: DISPENSED
Start: 2023-10-25

## (undated) RX ORDER — LIDOCAINE HYDROCHLORIDE 10 MG/ML
INJECTION, SOLUTION EPIDURAL; INFILTRATION; INTRACAUDAL; PERINEURAL
Status: DISPENSED
Start: 2023-10-25

## (undated) RX ORDER — FENTANYL CITRATE 50 UG/ML
INJECTION, SOLUTION INTRAMUSCULAR; INTRAVENOUS
Status: DISPENSED
Start: 2023-10-09